# Patient Record
Sex: FEMALE | Race: WHITE | Employment: PART TIME | URBAN - METROPOLITAN AREA
[De-identification: names, ages, dates, MRNs, and addresses within clinical notes are randomized per-mention and may not be internally consistent; named-entity substitution may affect disease eponyms.]

---

## 2020-07-27 ENCOUNTER — TRANSFERRED RECORDS (OUTPATIENT)
Dept: HEALTH INFORMATION MANAGEMENT | Facility: CLINIC | Age: 65
End: 2020-07-27

## 2020-08-25 ENCOUNTER — TELEPHONE (OUTPATIENT)
Dept: NEUROSURGERY | Facility: CLINIC | Age: 65
End: 2020-08-25

## 2020-08-25 ENCOUNTER — OFFICE VISIT (OUTPATIENT)
Dept: NEUROSURGERY | Facility: CLINIC | Age: 65
End: 2020-08-25
Attending: NEUROLOGICAL SURGERY
Payer: OTHER GOVERNMENT

## 2020-08-25 VITALS
WEIGHT: 230 LBS | HEART RATE: 63 BPM | OXYGEN SATURATION: 100 % | DIASTOLIC BLOOD PRESSURE: 80 MMHG | SYSTOLIC BLOOD PRESSURE: 152 MMHG | BODY MASS INDEX: 36.96 KG/M2 | HEIGHT: 66 IN

## 2020-08-25 DIAGNOSIS — M47.12 CERVICAL SPONDYLOSIS WITH MYELOPATHY: Primary | ICD-10-CM

## 2020-08-25 PROCEDURE — 99204 OFFICE O/P NEW MOD 45 MIN: CPT | Performed by: NEUROLOGICAL SURGERY

## 2020-08-25 PROCEDURE — G0463 HOSPITAL OUTPT CLINIC VISIT: HCPCS

## 2020-08-25 RX ORDER — CYCLOBENZAPRINE HCL 5 MG
2.5 TABLET ORAL AT BEDTIME
COMMUNITY
Start: 2020-07-20

## 2020-08-25 RX ORDER — LOSARTAN POTASSIUM 25 MG/1
25 TABLET ORAL EVERY MORNING
COMMUNITY
Start: 2020-07-20 | End: 2021-07-20

## 2020-08-25 RX ORDER — AMITRIPTYLINE HYDROCHLORIDE 10 MG/1
10 TABLET ORAL AT BEDTIME
COMMUNITY
Start: 2020-06-23 | End: 2021-06-23

## 2020-08-25 RX ORDER — FUROSEMIDE 20 MG
20 TABLET ORAL
COMMUNITY
Start: 2020-06-23 | End: 2021-06-23

## 2020-08-25 RX ORDER — DULOXETIN HYDROCHLORIDE 30 MG/1
30 CAPSULE, DELAYED RELEASE ORAL EVERY MORNING
COMMUNITY
Start: 2020-06-23 | End: 2020-11-03

## 2020-08-25 RX ORDER — NAPROXEN SODIUM 220 MG
440 TABLET ORAL EVERY MORNING
Status: ON HOLD | COMMUNITY
End: 2020-11-05

## 2020-08-25 RX ORDER — TRIAMCINOLONE ACETONIDE 1 MG/G
0.1 CREAM TOPICAL 2 TIMES DAILY PRN
COMMUNITY
Start: 2020-07-20

## 2020-08-25 RX ORDER — ATENOLOL 25 MG/1
25 TABLET ORAL
COMMUNITY
Start: 2020-07-20

## 2020-08-25 RX ORDER — TRAMADOL HYDROCHLORIDE 50 MG/1
50 TABLET ORAL 4 TIMES DAILY PRN
Status: ON HOLD | COMMUNITY
Start: 2020-08-12 | End: 2020-11-05

## 2020-08-25 RX ORDER — SODIUM PHOSPHATE,MONO-DIBASIC 19G-7G/118
500 ENEMA (ML) RECTAL DAILY
COMMUNITY

## 2020-08-25 ASSESSMENT — PAIN SCALES - GENERAL: PAINLEVEL: EXTREME PAIN (8)

## 2020-08-25 ASSESSMENT — MIFFLIN-ST. JEOR: SCORE: 1620.02

## 2020-08-25 NOTE — PATIENT INSTRUCTIONS
Patient Instructions    Surgery scheduled at [facility] for anterior cervical discectomy and spinal cord decompression at C5,6,7 with bone graft and titanium plate with Dr. Parkinson    Pre-Operative    Surgical risks: blood clots in the leg or lung, problems urinating, nerve damage, drainage from the incision, infection,stiffness    Pre-operative physical with primary care physician within 30 days of surgical date.     2-4 night hospitalization stay    Shower procedure  o Please shower with antimicrobial soap the night before surgery and morning of surgery. Please refer to showering instruction sheet in folder.    Eating/Drinking  o Stop all solid foods 8 hours before surgery.  o Keep drinking clear liquids until 4 hours before surgery  - Clear liquids include water, clear juice, black coffee, or clear tea without milk, Gatorade, clear soda.     Medications  o Hold Aspirin, NSAIDs (Advil/Ibuprofen, Indocin, Naproxen,Nuprin,Relafen/Nabumetone, Diclofenac,Meloxicam, Aleve, Celebrex) x 7 days prior to surgical date  o Hold methotrexate, Xeljanz for 1-2 weeks prior to surgery and continue to hold 2 weeks post surgery.   o You can take Tylenol (Acetaminophen) for pain, 1000 mg  - Do not exceed 3,000 mg per day   o Any other medications prescribed, please discuss with your primary care provider at your pre-operative physical     Pain Management    You will have some post-operative incisional pain which may require pain medications and muscle relaxants. You will receive medication upon discharge.    You may resume taking NSAIDs (ex. Ibuprofen, aleve, naproxen) 12 weeks after surgery     Do NOT drive while taking narcotic pain medication    Do NOT drink alcohol while using any pain medication    You can utilize ice as needed (no longer than 20 minutes at one time)    Incision Care    No submerging incision in water such as pools, hot tubs, baths for at least 8 weeks or until incision is healed    You may  get your incision wet in the shower. Allow water to run over incision, and gently pat dry.     Remove dressing as instructed upon discharge    Watch for signs of infection  o Redness, swelling, warmth, drainage, and fever of 101 degrees or higher  o Notify clinic [phone].    Activity Restrictions    For the first 6-8 weeks, no lifting > 10 pounds, no bending, twisting, or overhead reaching.    Take stairs in moderation     Ok to walk as tolerated, take short frequent walks. You may gradually increase the distance as tolerated.     Avoid bed rest and prolonged sitting for longer than 30 minutes (change positions frequently while awake)    No contact sports until after follow up visit    No high impact activities such as; running/jogging, snowmobile or 4 larkin riding or any other recreational vehicles    Please call the clinic if you develop any of the following symptoms:  o Swelling and/or warmth in one or both legs  o Pain or tenderness in your leg, ankle, foot, or arm   o Red or discolored skin     Post-Op Follow Up Appointments    2 week staple/suture removal with RN    6 week post op with xray prior     3 months post op with xray prior     6 months post op with xray prior    1 year post op with xray prior    Please call to schedule follow up and xray appointments at [phone]    Resources    If you are currently employed, you will need to be off work for 2-4 weeks for post op recovery and healing.    Please fax any FMLA/short term disability paperwork to [phone]    You may call our clinic when you'd like to return to work and we can provide a work letter    To allow staff adequate time to complete paperwork, please send as soon as possible

## 2020-08-25 NOTE — TELEPHONE ENCOUNTER
Off work letter created. Signed by Dr. Parkinson. Patient will call clinic back with her employer's fax number. Letter placed in nursing bin.
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
## 2020-08-25 NOTE — NURSING NOTE
"Vanessa Hernandez is a 64 year old female who presents for:  Chief Complaint   Patient presents with     Neck Pain     Cervical radiculopathy        Initial Vitals:  BP (!) 152/80 (BP Location: Left arm, Patient Position: Sitting)   Pulse 63   Ht 5' 6\" (1.676 m)   Wt 230 lb (104.3 kg)   SpO2 100%   BMI 37.12 kg/m   Estimated body mass index is 37.12 kg/m  as calculated from the following:    Height as of this encounter: 5' 6\" (1.676 m).    Weight as of this encounter: 230 lb (104.3 kg).. Body surface area is 2.2 meters squared. BP completed using cuff size: large  Extreme Pain (8)    Nursing Comments: Patient presents w/ bilateral neck pain down to fingers tingling sensation    Tom Banerjee MA  "

## 2020-08-25 NOTE — LETTER
[facility]   Neurosurgery Clinic   [address] [place] [address]  [id number]        To Whom it May Concern,      Vanessa Hernandez is being seen at our clinic. Please excuse her from work from 8/25/2020 through 9/15/2020.      Please call our clinic with questions or concerns: [phone]      Sincerely,            Rio Parkinson MD

## 2020-08-25 NOTE — NURSING NOTE
Reviewed pre- and post-operative instructions as outlined in the After Visit Summary/Patient Instructions with patient.   Surgery folder was given to patient    Patient Education Topic: Procedure with Dr. Parkinson     Learner(s): Patient    Knowledge Level: Basic    Readiness to Learn: Ready    Method:  Verbal explanation and Written material     Outcome: Able to verbalize instructions    Barriers to Learning: No barrier    Patient had the opportunity for questions to be answered. Advised Patient to call clinic with any questions/concerns. Gave patient antibacterial soap for pre-surgery skin preparation. Escorted patient to Pia, surgery scheduler, to schedule surgery.     Patient was offered for surgery on September 2nd as Dr. Parkinson is requesting this urgently due to spinal cord compression. Patient is aware that she needs to have surgery sooner rather than later. Patient declined to schedule at this time as she has an upcoming concert planned. She was advised to call Pia when ready to schedule.     Dr. Parkinson is OK with an off work letter from today through September 15th.

## 2020-08-26 DIAGNOSIS — G95.20 SPINAL CORD COMPRESSION (H): Primary | ICD-10-CM

## 2020-08-26 DIAGNOSIS — Z11.59 ENCOUNTER FOR SCREENING FOR OTHER VIRAL DISEASES: Primary | ICD-10-CM

## 2020-08-30 DIAGNOSIS — Z11.59 ENCOUNTER FOR SCREENING FOR OTHER VIRAL DISEASES: ICD-10-CM

## 2020-08-30 PROCEDURE — U0003 INFECTIOUS AGENT DETECTION BY NUCLEIC ACID (DNA OR RNA); SEVERE ACUTE RESPIRATORY SYNDROME CORONAVIRUS 2 (SARS-COV-2) (CORONAVIRUS DISEASE [COVID-19]), AMPLIFIED PROBE TECHNIQUE, MAKING USE OF HIGH THROUGHPUT TECHNOLOGIES AS DESCRIBED BY CMS-2020-01-R: HCPCS | Performed by: NEUROLOGICAL SURGERY

## 2020-08-31 LAB
SARS-COV-2 RNA SPEC QL NAA+PROBE: NOT DETECTED
SPECIMEN SOURCE: NORMAL

## 2020-09-02 ENCOUNTER — HOSPITAL ENCOUNTER (OUTPATIENT)
Facility: CLINIC | Age: 65
End: 2020-09-02
Attending: NEUROLOGICAL SURGERY | Admitting: NEUROLOGICAL SURGERY

## 2020-09-03 DIAGNOSIS — Z11.59 ENCOUNTER FOR SCREENING FOR OTHER VIRAL DISEASES: Primary | ICD-10-CM

## 2020-09-16 ENCOUNTER — TELEPHONE (OUTPATIENT)
Dept: NEUROSURGERY | Facility: CLINIC | Age: 65
End: 2020-09-16

## 2020-09-16 NOTE — TELEPHONE ENCOUNTER
Patient needed updated work letter. Quan Schmidt PA-C signed. Informed patient letter was faxed.     Faxed work letter September 16, 2020 to fax number [phone]    Right Fax confirmed at 1610 PM    Ivelisse Yanez RN

## 2020-09-16 NOTE — LETTER
[facility]  Neurosurgery Clinic  [address] [place] [address]  [phone]        9/16/2020    To Whom it May Concern,      Vanessa Hernandez is being seen at our clinic and is scheduled for an upcoming surgical procedure on 9/23/20. Please excuse her from work from 9/15/2020-11/4/20.    She will be re-evaluated at the next follow up visit on 11/4/20. Please call our clinic with questions or concerns: [phone]      Sincerely,        Quan Schmidt PA-C

## 2020-10-22 ENCOUNTER — TELEPHONE (OUTPATIENT)
Dept: NEUROSURGERY | Facility: CLINIC | Age: 65
End: 2020-10-22

## 2020-10-22 NOTE — LETTER
[facility]    Neurosurgery Clinic  [address] [place] [address]  [id number]      10/22/2020     To Whom it May Concern,        Vanessa Hernandez is being seen at our clinic and is scheduled for an upcoming surgical procedure on 11/4. Please excuse her from work from 10/22/20-12/16/2020.     She will be re-evaluated at the next follow up visit on 11/4/20. Please call our clinic with questions or concerns: [phone]        Sincerely,           Quan Schmidt PA-C

## 2020-10-22 NOTE — TELEPHONE ENCOUNTER
Spoke to patient to see where she would like letter sent. Reviewed letter with her and she needed the letter changed to reflect today date until 6 weeks post op so there is no lapse in coverage. Letter updated and signed by Quan Schmidt PA-C.     Faxed work letter ATTN: [employer]  October 22, 2020 to fax number [phone]    Right Fax confirmed at 1535 PM    Ivelisse Yanez RN

## 2020-10-22 NOTE — TELEPHONE ENCOUNTER
Called and spoke to the patient. She needs an updated letter since her surgery date was moved. Updated letter to include being off work from 11/4-12/16 (6 weeks). Will route to Quan Schmidt PA-C to have him sign next time he is in the office.

## 2020-10-22 NOTE — LETTER
[facility]    Neurosurgery Clinic  [address] [place] [address]  [phone]      9/16/2020     To Whom it May Concern,        Vanessa Hernandez is being seen at our clinic and is scheduled for an upcoming surgical procedure on 11/4. Please excuse her from work from 11/4-12/16/2020     She will be re-evaluated at the next follow up visit on 11/4/20. Please call our clinic with questions or concerns: [phone]        Sincerely,           Quan Schmidt PA-C

## 2020-11-01 DIAGNOSIS — Z11.59 ENCOUNTER FOR SCREENING FOR OTHER VIRAL DISEASES: ICD-10-CM

## 2020-11-01 PROCEDURE — U0003 INFECTIOUS AGENT DETECTION BY NUCLEIC ACID (DNA OR RNA); SEVERE ACUTE RESPIRATORY SYNDROME CORONAVIRUS 2 (SARS-COV-2) (CORONAVIRUS DISEASE [COVID-19]), AMPLIFIED PROBE TECHNIQUE, MAKING USE OF HIGH THROUGHPUT TECHNOLOGIES AS DESCRIBED BY CMS-2020-01-R: HCPCS | Performed by: NEUROLOGICAL SURGERY

## 2020-11-02 LAB
SARS-COV-2 RNA SPEC QL NAA+PROBE: NOT DETECTED
SPECIMEN SOURCE: NORMAL

## 2020-11-03 RX ORDER — ALBUTEROL SULFATE 90 UG/1
2 AEROSOL, METERED RESPIRATORY (INHALATION) EVERY 4 HOURS PRN
COMMUNITY

## 2020-11-03 RX ORDER — FAMOTIDINE 10 MG
10 TABLET ORAL DAILY PRN
COMMUNITY

## 2020-11-03 RX ORDER — LORAZEPAM 0.5 MG/1
0.5 TABLET ORAL
COMMUNITY

## 2020-11-03 RX ORDER — ASCORBIC ACID 500 MG
500 TABLET ORAL DAILY
COMMUNITY

## 2020-11-03 NOTE — PROGRESS NOTES
PTA medications updated by Medication Scribe prior to surgery via phone call with patient      -LAST DOSES ENTERED BY NURSE-    Comments:    Medication history sources: Patient, Surescripts and H&P  Medication history source reliability: Moderate  Adherence assessment: N/A Not Observed    Significant changes made to the medication list:  None      Additional medication history information:   None        Prior to Admission medications    Medication Sig Last Dose Taking? Auth Provider   albuterol (PROAIR HFA/PROVENTIL HFA/VENTOLIN HFA) 108 (90 Base) MCG/ACT inhaler Inhale 2 puffs into the lungs every 4 hours as needed for shortness of breath / dyspnea or wheezing  at PRN Yes Reported, Patient   amitriptyline (ELAVIL) 10 MG tablet Take 10 mg by mouth At Bedtime   at PM Yes Reported, Patient   atenolol (TENORMIN) 25 MG tablet Take 25 mg by mouth 2 times daily   at AM Yes Reported, Patient   cyclobenzaprine (FLEXERIL) 5 MG tablet Take 2.5 mg by mouth nightly as needed (0.5 X 5 mg = 2.5 mg dose)  at PM Yes Reported, Patient   famotidine (PEPCID) 10 MG tablet Take 10 mg by mouth daily as needed (upset stomach)  at PRN Yes Reported, Patient   furosemide (LASIX) 20 MG tablet Take 20 mg by mouth daily before breakfast  at AM Yes Reported, Patient   glucosamine-chondroitin 500-400 MG CAPS per capsule Take 500 capsules by mouth daily  at AM Yes Reported, Patient   HEMP OIL OR EXTRACT OR OTHER CBD CANNABINOID, NOT MEDICAL CANNABIS, Apply topically nightly as needed  at PM Yes Reported, Patient   LORazepam (ATIVAN) 0.5 MG tablet Take 0.5 mg by mouth nightly as needed for anxiety  at PM Yes Reported, Patient   losartan (COZAAR) 25 MG tablet Take 25 mg by mouth every morning   at AM Yes Reported, Patient   naproxen sodium (ANAPROX) 220 MG tablet Take 440 mg by mouth every morning (220mg x 2 = 440mg)  at AM Yes Reported, Patient   traMADol (ULTRAM) 50 MG tablet Take 50 mg by mouth 4 times daily as needed   at PM Yes Reported,  Patient   triamcinolone (KENALOG) 0.1 % external cream Apply 0.1 mg% topically 2 times daily as needed (rash)   at PRN Yes Reported, Patient   vitamin C (ASCORBIC ACID) 500 MG tablet Take 500 mg by mouth daily  at AM Yes Reported, Patient

## 2020-11-04 ENCOUNTER — APPOINTMENT (OUTPATIENT)
Dept: GENERAL RADIOLOGY | Facility: CLINIC | Age: 65
End: 2020-11-04
Attending: NEUROLOGICAL SURGERY
Payer: OTHER GOVERNMENT

## 2020-11-04 ENCOUNTER — HOSPITAL ENCOUNTER (OUTPATIENT)
Facility: CLINIC | Age: 65
Discharge: HOME | End: 2020-11-05
Attending: NEUROLOGICAL SURGERY | Admitting: NEUROLOGICAL SURGERY
Payer: OTHER GOVERNMENT

## 2020-11-04 ENCOUNTER — ANESTHESIA EVENT (OUTPATIENT)
Dept: SURGERY | Facility: CLINIC | Age: 65
End: 2020-11-04
Payer: OTHER GOVERNMENT

## 2020-11-04 ENCOUNTER — ANESTHESIA (OUTPATIENT)
Dept: SURGERY | Facility: CLINIC | Age: 65
End: 2020-11-04
Payer: OTHER GOVERNMENT

## 2020-11-04 DIAGNOSIS — M43.22 CERVICAL VERTEBRAL FUSION: Primary | ICD-10-CM

## 2020-11-04 LAB — POTASSIUM SERPL-SCNC: 4.2 MMOL/L (ref 3.4–5.3)

## 2020-11-04 PROCEDURE — 922N000001 HC NEURO MONITORING SERVICE, UP TO 7 HOURS (T1FEE): Performed by: NEUROLOGICAL SURGERY

## 2020-11-04 PROCEDURE — 370N000002 HC ANESTHESIA TECHNICAL FEE, EACH ADDTL 15 MIN: Performed by: NEUROLOGICAL SURGERY

## 2020-11-04 PROCEDURE — 999N000138 HC STATISTIC PRE-PROCEDURE ASSESSMENT I: Performed by: NEUROLOGICAL SURGERY

## 2020-11-04 PROCEDURE — 84132 ASSAY OF SERUM POTASSIUM: CPT | Performed by: ANESTHESIOLOGY

## 2020-11-04 PROCEDURE — 258N000003 HC RX IP 258 OP 636: Performed by: PHYSICIAN ASSISTANT

## 2020-11-04 PROCEDURE — 250N000011 HC RX IP 250 OP 636: Performed by: NEUROLOGICAL SURGERY

## 2020-11-04 PROCEDURE — 250N000011 HC RX IP 250 OP 636: Performed by: NURSE ANESTHETIST, CERTIFIED REGISTERED

## 2020-11-04 PROCEDURE — 36415 COLL VENOUS BLD VENIPUNCTURE: CPT | Performed by: ANESTHESIOLOGY

## 2020-11-04 PROCEDURE — 370N000001 HC ANESTHESIA TECHNICAL FEE, 1ST 30 MIN: Performed by: NEUROLOGICAL SURGERY

## 2020-11-04 PROCEDURE — 761N000002 HC RECOVERY PHASE 1 LEVEL 1 EA ADDTL HR: Performed by: NEUROLOGICAL SURGERY

## 2020-11-04 PROCEDURE — C1762 CONN TISS, HUMAN(INC FASCIA): HCPCS | Performed by: NEUROLOGICAL SURGERY

## 2020-11-04 PROCEDURE — 22853 INSJ BIOMECHANICAL DEVICE: CPT | Performed by: NEUROLOGICAL SURGERY

## 2020-11-04 PROCEDURE — 22845 INSERT SPINE FIXATION DEVICE: CPT | Mod: AS | Performed by: PHYSICIAN ASSISTANT

## 2020-11-04 PROCEDURE — 761N000001 HC RECOVERY PHASE 1 LEVEL 1 FIRST HR: Performed by: NEUROLOGICAL SURGERY

## 2020-11-04 PROCEDURE — 272N000282 HC OR IOM SUPPLIES OPNP: Performed by: NEUROLOGICAL SURGERY

## 2020-11-04 PROCEDURE — 250N000009 HC RX 250: Performed by: NEUROLOGICAL SURGERY

## 2020-11-04 PROCEDURE — 258N000003 HC RX IP 258 OP 636: Performed by: NURSE ANESTHETIST, CERTIFIED REGISTERED

## 2020-11-04 PROCEDURE — 120N000001 HC R&B MED SURG/OB

## 2020-11-04 PROCEDURE — 250N000013 HC RX MED GY IP 250 OP 250 PS 637: Performed by: PHYSICIAN ASSISTANT

## 2020-11-04 PROCEDURE — 22853 INSJ BIOMECHANICAL DEVICE: CPT | Mod: AS | Performed by: PHYSICIAN ASSISTANT

## 2020-11-04 PROCEDURE — 250N000003 HC SEVOFLURANE, EA 15 MIN: Performed by: NEUROLOGICAL SURGERY

## 2020-11-04 PROCEDURE — 22845 INSERT SPINE FIXATION DEVICE: CPT | Mod: 59 | Performed by: NEUROLOGICAL SURGERY

## 2020-11-04 PROCEDURE — 360N000033 HC SURGERY LEVEL 5 EA 15 ADDTL MIN: Performed by: NEUROLOGICAL SURGERY

## 2020-11-04 PROCEDURE — 250N000009 HC RX 250: Performed by: PHYSICIAN ASSISTANT

## 2020-11-04 PROCEDURE — 22551 ARTHRD ANT NTRBDY CERVICAL: CPT | Performed by: NEUROLOGICAL SURGERY

## 2020-11-04 PROCEDURE — 360N000036 HC SURGERY LEVEL 5 W FLUORO 1ST 30 MIN: Performed by: NEUROLOGICAL SURGERY

## 2020-11-04 PROCEDURE — 272N000001 HC OR GENERAL SUPPLY STERILE: Performed by: NEUROLOGICAL SURGERY

## 2020-11-04 PROCEDURE — 999N000179 XR SURGERY CARM FLUORO LESS THAN 5 MIN W STILLS

## 2020-11-04 PROCEDURE — 22551 ARTHRD ANT NTRBDY CERVICAL: CPT | Mod: AS | Performed by: PHYSICIAN ASSISTANT

## 2020-11-04 PROCEDURE — C1713 ANCHOR/SCREW BN/BN,TIS/BN: HCPCS | Performed by: NEUROLOGICAL SURGERY

## 2020-11-04 PROCEDURE — 250N000009 HC RX 250: Performed by: NURSE ANESTHETIST, CERTIFIED REGISTERED

## 2020-11-04 PROCEDURE — 250N000011 HC RX IP 250 OP 636: Performed by: ANESTHESIOLOGY

## 2020-11-04 PROCEDURE — 20930 SP BONE ALGRFT MORSEL ADD-ON: CPT | Performed by: NEUROLOGICAL SURGERY

## 2020-11-04 PROCEDURE — 250N000011 HC RX IP 250 OP 636: Performed by: PHYSICIAN ASSISTANT

## 2020-11-04 DEVICE — GRAFT CANC CORT BLOCK LORDOTIC ASR 7X14X14MM 345744: Type: IMPLANTABLE DEVICE | Site: SPINE CERVICAL | Status: FUNCTIONAL

## 2020-11-04 DEVICE — IMP SCR CERVICAL MEDT ATLANTIS 4.0X14MM ST VA 3120314: Type: IMPLANTABLE DEVICE | Site: SPINE CERVICAL | Status: FUNCTIONAL

## 2020-11-04 DEVICE — IMPLANTABLE DEVICE: Type: IMPLANTABLE DEVICE | Site: SPINE CERVICAL | Status: FUNCTIONAL

## 2020-11-04 RX ORDER — FENTANYL CITRATE 50 UG/ML
25-100 INJECTION, SOLUTION INTRAMUSCULAR; INTRAVENOUS
Status: DISCONTINUED | OUTPATIENT
Start: 2020-11-04 | End: 2020-11-04 | Stop reason: HOSPADM

## 2020-11-04 RX ORDER — PROCHLORPERAZINE MALEATE 10 MG
10 TABLET ORAL EVERY 6 HOURS PRN
Status: DISCONTINUED | OUTPATIENT
Start: 2020-11-04 | End: 2020-11-05 | Stop reason: HOSPADM

## 2020-11-04 RX ORDER — SODIUM CHLORIDE, SODIUM LACTATE, POTASSIUM CHLORIDE, CALCIUM CHLORIDE 600; 310; 30; 20 MG/100ML; MG/100ML; MG/100ML; MG/100ML
INJECTION, SOLUTION INTRAVENOUS CONTINUOUS PRN
Status: DISCONTINUED | OUTPATIENT
Start: 2020-11-04 | End: 2020-11-04

## 2020-11-04 RX ORDER — TRIAMCINOLONE ACETONIDE 1 MG/G
CREAM TOPICAL 2 TIMES DAILY PRN
Status: DISCONTINUED | OUTPATIENT
Start: 2020-11-04 | End: 2020-11-05 | Stop reason: HOSPADM

## 2020-11-04 RX ORDER — NAPROXEN SODIUM 220 MG
440 TABLET ORAL EVERY MORNING
Status: DISCONTINUED | OUTPATIENT
Start: 2020-11-05 | End: 2020-11-05 | Stop reason: DRUGHIGH

## 2020-11-04 RX ORDER — SODIUM CHLORIDE, SODIUM LACTATE, POTASSIUM CHLORIDE, CALCIUM CHLORIDE 600; 310; 30; 20 MG/100ML; MG/100ML; MG/100ML; MG/100ML
INJECTION, SOLUTION INTRAVENOUS CONTINUOUS
Status: DISCONTINUED | OUTPATIENT
Start: 2020-11-04 | End: 2020-11-04 | Stop reason: HOSPADM

## 2020-11-04 RX ORDER — HYDROMORPHONE HYDROCHLORIDE 1 MG/ML
.3-.5 INJECTION, SOLUTION INTRAMUSCULAR; INTRAVENOUS; SUBCUTANEOUS
Status: DISCONTINUED | OUTPATIENT
Start: 2020-11-04 | End: 2020-11-05 | Stop reason: HOSPADM

## 2020-11-04 RX ORDER — HYDROMORPHONE HYDROCHLORIDE 1 MG/ML
.3-.5 INJECTION, SOLUTION INTRAMUSCULAR; INTRAVENOUS; SUBCUTANEOUS EVERY 10 MIN PRN
Status: DISCONTINUED | OUTPATIENT
Start: 2020-11-04 | End: 2020-11-04 | Stop reason: HOSPADM

## 2020-11-04 RX ORDER — ONDANSETRON 2 MG/ML
4 INJECTION INTRAMUSCULAR; INTRAVENOUS EVERY 6 HOURS PRN
Status: DISCONTINUED | OUTPATIENT
Start: 2020-11-04 | End: 2020-11-05 | Stop reason: HOSPADM

## 2020-11-04 RX ORDER — NALOXONE HYDROCHLORIDE 0.4 MG/ML
.1-.4 INJECTION, SOLUTION INTRAMUSCULAR; INTRAVENOUS; SUBCUTANEOUS
Status: DISCONTINUED | OUTPATIENT
Start: 2020-11-04 | End: 2020-11-04 | Stop reason: HOSPADM

## 2020-11-04 RX ORDER — FUROSEMIDE 20 MG
20 TABLET ORAL
Status: DISCONTINUED | OUTPATIENT
Start: 2020-11-05 | End: 2020-11-05 | Stop reason: HOSPADM

## 2020-11-04 RX ORDER — ALBUTEROL SULFATE 90 UG/1
2 AEROSOL, METERED RESPIRATORY (INHALATION) EVERY 4 HOURS PRN
Status: DISCONTINUED | OUTPATIENT
Start: 2020-11-04 | End: 2020-11-05 | Stop reason: HOSPADM

## 2020-11-04 RX ORDER — DEXAMETHASONE SODIUM PHOSPHATE 4 MG/ML
INJECTION, SOLUTION INTRA-ARTICULAR; INTRALESIONAL; INTRAMUSCULAR; INTRAVENOUS; SOFT TISSUE PRN
Status: DISCONTINUED | OUTPATIENT
Start: 2020-11-04 | End: 2020-11-04

## 2020-11-04 RX ORDER — LIDOCAINE 40 MG/G
CREAM TOPICAL
Status: DISCONTINUED | OUTPATIENT
Start: 2020-11-04 | End: 2020-11-05 | Stop reason: HOSPADM

## 2020-11-04 RX ORDER — DIAZEPAM 10 MG/2ML
2.5 INJECTION, SOLUTION INTRAMUSCULAR; INTRAVENOUS
Status: DISCONTINUED | OUTPATIENT
Start: 2020-11-04 | End: 2020-11-04 | Stop reason: HOSPADM

## 2020-11-04 RX ORDER — ONDANSETRON 2 MG/ML
INJECTION INTRAMUSCULAR; INTRAVENOUS PRN
Status: DISCONTINUED | OUTPATIENT
Start: 2020-11-04 | End: 2020-11-04

## 2020-11-04 RX ORDER — CLINDAMYCIN PHOSPHATE 900 MG/50ML
900 INJECTION, SOLUTION INTRAVENOUS SEE ADMIN INSTRUCTIONS
Status: DISCONTINUED | OUTPATIENT
Start: 2020-11-04 | End: 2020-11-04 | Stop reason: HOSPADM

## 2020-11-04 RX ORDER — ACETAMINOPHEN 325 MG/1
975 TABLET ORAL EVERY 8 HOURS
Status: DISCONTINUED | OUTPATIENT
Start: 2020-11-04 | End: 2020-11-05 | Stop reason: HOSPADM

## 2020-11-04 RX ORDER — ATENOLOL 25 MG/1
25 TABLET ORAL
Status: DISCONTINUED | OUTPATIENT
Start: 2020-11-04 | End: 2020-11-05 | Stop reason: HOSPADM

## 2020-11-04 RX ORDER — ONDANSETRON 4 MG/1
4 TABLET, ORALLY DISINTEGRATING ORAL EVERY 6 HOURS PRN
Status: DISCONTINUED | OUTPATIENT
Start: 2020-11-04 | End: 2020-11-05 | Stop reason: HOSPADM

## 2020-11-04 RX ORDER — OXYCODONE HYDROCHLORIDE 5 MG/1
5-10 TABLET ORAL
Status: DISCONTINUED | OUTPATIENT
Start: 2020-11-04 | End: 2020-11-05 | Stop reason: HOSPADM

## 2020-11-04 RX ORDER — TRAMADOL HYDROCHLORIDE 50 MG/1
50 TABLET ORAL 4 TIMES DAILY PRN
Status: DISCONTINUED | OUTPATIENT
Start: 2020-11-04 | End: 2020-11-05 | Stop reason: HOSPADM

## 2020-11-04 RX ORDER — PROPOFOL 10 MG/ML
INJECTION, EMULSION INTRAVENOUS PRN
Status: DISCONTINUED | OUTPATIENT
Start: 2020-11-04 | End: 2020-11-04

## 2020-11-04 RX ORDER — LORAZEPAM 0.5 MG/1
0.5 TABLET ORAL
Status: DISCONTINUED | OUTPATIENT
Start: 2020-11-04 | End: 2020-11-05 | Stop reason: HOSPADM

## 2020-11-04 RX ORDER — PROPOFOL 10 MG/ML
INJECTION, EMULSION INTRAVENOUS CONTINUOUS PRN
Status: DISCONTINUED | OUTPATIENT
Start: 2020-11-04 | End: 2020-11-04

## 2020-11-04 RX ORDER — ONDANSETRON 4 MG/1
4 TABLET, ORALLY DISINTEGRATING ORAL EVERY 30 MIN PRN
Status: DISCONTINUED | OUTPATIENT
Start: 2020-11-04 | End: 2020-11-04 | Stop reason: HOSPADM

## 2020-11-04 RX ORDER — CLINDAMYCIN PHOSPHATE 900 MG/50ML
900 INJECTION, SOLUTION INTRAVENOUS
Status: COMPLETED | OUTPATIENT
Start: 2020-11-04 | End: 2020-11-04

## 2020-11-04 RX ORDER — SODIUM CHLORIDE 9 MG/ML
INJECTION, SOLUTION INTRAVENOUS CONTINUOUS
Status: DISCONTINUED | OUTPATIENT
Start: 2020-11-04 | End: 2020-11-05 | Stop reason: HOSPADM

## 2020-11-04 RX ORDER — MEPERIDINE HYDROCHLORIDE 25 MG/ML
12.5 INJECTION INTRAMUSCULAR; INTRAVENOUS; SUBCUTANEOUS
Status: DISCONTINUED | OUTPATIENT
Start: 2020-11-04 | End: 2020-11-04 | Stop reason: HOSPADM

## 2020-11-04 RX ORDER — NALOXONE HYDROCHLORIDE 0.4 MG/ML
.1-.4 INJECTION, SOLUTION INTRAMUSCULAR; INTRAVENOUS; SUBCUTANEOUS
Status: DISCONTINUED | OUTPATIENT
Start: 2020-11-04 | End: 2020-11-05 | Stop reason: HOSPADM

## 2020-11-04 RX ORDER — EPHEDRINE SULFATE 50 MG/ML
INJECTION, SOLUTION INTRAMUSCULAR; INTRAVENOUS; SUBCUTANEOUS PRN
Status: DISCONTINUED | OUTPATIENT
Start: 2020-11-04 | End: 2020-11-04

## 2020-11-04 RX ORDER — FENTANYL CITRATE 50 UG/ML
INJECTION, SOLUTION INTRAMUSCULAR; INTRAVENOUS PRN
Status: DISCONTINUED | OUTPATIENT
Start: 2020-11-04 | End: 2020-11-04

## 2020-11-04 RX ORDER — ACETAMINOPHEN 325 MG/1
650 TABLET ORAL EVERY 4 HOURS PRN
Status: DISCONTINUED | OUTPATIENT
Start: 2020-11-07 | End: 2020-11-05 | Stop reason: HOSPADM

## 2020-11-04 RX ORDER — LOSARTAN POTASSIUM 25 MG/1
25 TABLET ORAL EVERY MORNING
Status: DISCONTINUED | OUTPATIENT
Start: 2020-11-05 | End: 2020-11-05 | Stop reason: HOSPADM

## 2020-11-04 RX ORDER — FENTANYL CITRATE 50 UG/ML
25-50 INJECTION, SOLUTION INTRAMUSCULAR; INTRAVENOUS
Status: DISCONTINUED | OUTPATIENT
Start: 2020-11-04 | End: 2020-11-04 | Stop reason: HOSPADM

## 2020-11-04 RX ORDER — LIDOCAINE HYDROCHLORIDE 20 MG/ML
INJECTION, SOLUTION INFILTRATION; PERINEURAL PRN
Status: DISCONTINUED | OUTPATIENT
Start: 2020-11-04 | End: 2020-11-04

## 2020-11-04 RX ORDER — AMITRIPTYLINE HYDROCHLORIDE 10 MG/1
10 TABLET ORAL AT BEDTIME
Status: DISCONTINUED | OUTPATIENT
Start: 2020-11-04 | End: 2020-11-05 | Stop reason: HOSPADM

## 2020-11-04 RX ORDER — HYDROXYZINE HYDROCHLORIDE 25 MG/1
25 TABLET, FILM COATED ORAL EVERY 6 HOURS PRN
Status: DISCONTINUED | OUTPATIENT
Start: 2020-11-04 | End: 2020-11-05 | Stop reason: HOSPADM

## 2020-11-04 RX ORDER — DOCUSATE SODIUM 100 MG/1
100 CAPSULE, LIQUID FILLED ORAL 2 TIMES DAILY
Status: DISCONTINUED | OUTPATIENT
Start: 2020-11-04 | End: 2020-11-05 | Stop reason: HOSPADM

## 2020-11-04 RX ORDER — ACETAMINOPHEN 650 MG/1
650 SUPPOSITORY RECTAL EVERY 4 HOURS PRN
Status: DISCONTINUED | OUTPATIENT
Start: 2020-11-04 | End: 2020-11-04 | Stop reason: HOSPADM

## 2020-11-04 RX ORDER — ASCORBIC ACID 500 MG
500 TABLET ORAL DAILY
Status: DISCONTINUED | OUTPATIENT
Start: 2020-11-04 | End: 2020-11-05 | Stop reason: HOSPADM

## 2020-11-04 RX ORDER — DEXAMETHASONE SODIUM PHOSPHATE 10 MG/ML
4 INJECTION, SOLUTION INTRAMUSCULAR; INTRAVENOUS ONCE
Status: DISCONTINUED | OUTPATIENT
Start: 2020-11-04 | End: 2020-11-04 | Stop reason: HOSPADM

## 2020-11-04 RX ORDER — ONDANSETRON 2 MG/ML
4 INJECTION INTRAMUSCULAR; INTRAVENOUS EVERY 30 MIN PRN
Status: DISCONTINUED | OUTPATIENT
Start: 2020-11-04 | End: 2020-11-04 | Stop reason: HOSPADM

## 2020-11-04 RX ORDER — CYCLOBENZAPRINE HYDROCHLORIDE 5 MG/1
2.5 TABLET, FILM COATED ORAL
Status: DISCONTINUED | OUTPATIENT
Start: 2020-11-04 | End: 2020-11-05 | Stop reason: HOSPADM

## 2020-11-04 RX ORDER — FAMOTIDINE 10 MG
10 TABLET ORAL DAILY PRN
Status: DISCONTINUED | OUTPATIENT
Start: 2020-11-04 | End: 2020-11-05 | Stop reason: HOSPADM

## 2020-11-04 RX ADMIN — HYDROMORPHONE HYDROCHLORIDE 0.5 MG: 1 INJECTION, SOLUTION INTRAMUSCULAR; INTRAVENOUS; SUBCUTANEOUS at 14:45

## 2020-11-04 RX ADMIN — PHENYLEPHRINE HYDROCHLORIDE 100 MCG: 10 INJECTION INTRAVENOUS at 11:48

## 2020-11-04 RX ADMIN — Medication 5 MG: at 12:06

## 2020-11-04 RX ADMIN — SODIUM CHLORIDE, POTASSIUM CHLORIDE, SODIUM LACTATE AND CALCIUM CHLORIDE: 600; 310; 30; 20 INJECTION, SOLUTION INTRAVENOUS at 11:19

## 2020-11-04 RX ADMIN — HYDROMORPHONE HYDROCHLORIDE 0.5 MG: 1 INJECTION, SOLUTION INTRAMUSCULAR; INTRAVENOUS; SUBCUTANEOUS at 19:59

## 2020-11-04 RX ADMIN — PHENYLEPHRINE HYDROCHLORIDE 150 MCG: 10 INJECTION INTRAVENOUS at 11:59

## 2020-11-04 RX ADMIN — LORAZEPAM 0.5 MG: 0.5 TABLET ORAL at 21:28

## 2020-11-04 RX ADMIN — PHENYLEPHRINE HYDROCHLORIDE 50 MCG: 10 INJECTION INTRAVENOUS at 13:26

## 2020-11-04 RX ADMIN — ATENOLOL 25 MG: 25 TABLET ORAL at 17:08

## 2020-11-04 RX ADMIN — PROPOFOL 200 MG: 10 INJECTION, EMULSION INTRAVENOUS at 11:26

## 2020-11-04 RX ADMIN — CYCLOBENZAPRINE HYDROCHLORIDE 2.5 MG: 5 TABLET, FILM COATED ORAL at 21:28

## 2020-11-04 RX ADMIN — DEXAMETHASONE SODIUM PHOSPHATE 10 MG: 4 INJECTION, SOLUTION INTRA-ARTICULAR; INTRALESIONAL; INTRAMUSCULAR; INTRAVENOUS; SOFT TISSUE at 11:44

## 2020-11-04 RX ADMIN — FENTANYL CITRATE 50 MCG: 0.05 INJECTION, SOLUTION INTRAMUSCULAR; INTRAVENOUS at 15:48

## 2020-11-04 RX ADMIN — Medication 5 MG: at 11:50

## 2020-11-04 RX ADMIN — OXYCODONE HYDROCHLORIDE 5 MG: 5 TABLET ORAL at 23:21

## 2020-11-04 RX ADMIN — PHENYLEPHRINE HYDROCHLORIDE 50 MCG: 10 INJECTION INTRAVENOUS at 13:20

## 2020-11-04 RX ADMIN — ACETAMINOPHEN 975 MG: 325 TABLET, FILM COATED ORAL at 17:08

## 2020-11-04 RX ADMIN — HYDROMORPHONE HYDROCHLORIDE 0.5 MG: 1 INJECTION, SOLUTION INTRAMUSCULAR; INTRAVENOUS; SUBCUTANEOUS at 15:02

## 2020-11-04 RX ADMIN — AMITRIPTYLINE HYDROCHLORIDE 10 MG: 10 TABLET, FILM COATED ORAL at 21:28

## 2020-11-04 RX ADMIN — REMIFENTANIL HYDROCHLORIDE 0.1 MCG/KG/MIN: 1 INJECTION, POWDER, LYOPHILIZED, FOR SOLUTION INTRAVENOUS at 11:30

## 2020-11-04 RX ADMIN — ONDANSETRON 4 MG: 2 INJECTION INTRAMUSCULAR; INTRAVENOUS at 14:39

## 2020-11-04 RX ADMIN — FENTANYL CITRATE 100 MCG: 50 INJECTION, SOLUTION INTRAMUSCULAR; INTRAVENOUS at 11:26

## 2020-11-04 RX ADMIN — Medication 5 MG: at 12:41

## 2020-11-04 RX ADMIN — PHENYLEPHRINE HYDROCHLORIDE 200 MCG: 10 INJECTION INTRAVENOUS at 12:38

## 2020-11-04 RX ADMIN — LIDOCAINE HYDROCHLORIDE 50 MG: 20 INJECTION, SOLUTION INFILTRATION; PERINEURAL at 11:26

## 2020-11-04 RX ADMIN — Medication 2.5 MG: at 11:53

## 2020-11-04 RX ADMIN — Medication 2.5 MG: at 11:43

## 2020-11-04 RX ADMIN — PHENYLEPHRINE HYDROCHLORIDE 100 MCG: 10 INJECTION INTRAVENOUS at 12:44

## 2020-11-04 RX ADMIN — HYDROMORPHONE HYDROCHLORIDE 0.5 MG: 1 INJECTION, SOLUTION INTRAMUSCULAR; INTRAVENOUS; SUBCUTANEOUS at 14:13

## 2020-11-04 RX ADMIN — SUCCINYLCHOLINE CHLORIDE 100 MG: 20 INJECTION, SOLUTION INTRAMUSCULAR; INTRAVENOUS; PARENTERAL at 11:27

## 2020-11-04 RX ADMIN — PROPOFOL 150 MCG/KG/MIN: 10 INJECTION, EMULSION INTRAVENOUS at 11:29

## 2020-11-04 RX ADMIN — PHENYLEPHRINE HYDROCHLORIDE 150 MCG: 10 INJECTION INTRAVENOUS at 11:50

## 2020-11-04 RX ADMIN — PHENYLEPHRINE HYDROCHLORIDE 50 MCG: 10 INJECTION INTRAVENOUS at 13:29

## 2020-11-04 RX ADMIN — Medication 5 MG: at 11:42

## 2020-11-04 RX ADMIN — PHENYLEPHRINE HYDROCHLORIDE 100 MCG: 10 INJECTION INTRAVENOUS at 12:06

## 2020-11-04 RX ADMIN — DOCUSATE SODIUM 100 MG: 100 CAPSULE, LIQUID FILLED ORAL at 21:28

## 2020-11-04 RX ADMIN — PHENYLEPHRINE HYDROCHLORIDE 100 MCG: 10 INJECTION INTRAVENOUS at 13:32

## 2020-11-04 RX ADMIN — PHENYLEPHRINE HYDROCHLORIDE 100 MCG: 10 INJECTION INTRAVENOUS at 12:32

## 2020-11-04 RX ADMIN — Medication 5 MG: at 11:47

## 2020-11-04 RX ADMIN — SODIUM CHLORIDE: 9 INJECTION, SOLUTION INTRAVENOUS at 17:08

## 2020-11-04 RX ADMIN — ONDANSETRON 4 MG: 2 INJECTION INTRAMUSCULAR; INTRAVENOUS at 14:09

## 2020-11-04 RX ADMIN — SODIUM CHLORIDE, POTASSIUM CHLORIDE, SODIUM LACTATE AND CALCIUM CHLORIDE: 600; 310; 30; 20 INJECTION, SOLUTION INTRAVENOUS at 14:04

## 2020-11-04 RX ADMIN — CLINDAMYCIN PHOSPHATE 900 MG: 900 INJECTION, SOLUTION INTRAVENOUS at 11:30

## 2020-11-04 RX ADMIN — MIDAZOLAM 2 MG: 1 INJECTION INTRAMUSCULAR; INTRAVENOUS at 11:19

## 2020-11-04 RX ADMIN — HYDROXYZINE HYDROCHLORIDE 25 MG: 25 TABLET, FILM COATED ORAL at 17:49

## 2020-11-04 RX ADMIN — PHENYLEPHRINE HYDROCHLORIDE 100 MCG: 10 INJECTION INTRAVENOUS at 11:53

## 2020-11-04 RX ADMIN — OXYCODONE HYDROCHLORIDE AND ACETAMINOPHEN 500 MG: 500 TABLET ORAL at 17:08

## 2020-11-04 RX ADMIN — PHENYLEPHRINE HYDROCHLORIDE 100 MCG: 10 INJECTION INTRAVENOUS at 11:42

## 2020-11-04 ASSESSMENT — LIFESTYLE VARIABLES: TOBACCO_USE: 1

## 2020-11-04 ASSESSMENT — MIFFLIN-ST. JEOR: SCORE: 1669.92

## 2020-11-04 ASSESSMENT — ACTIVITIES OF DAILY LIVING (ADL): ADLS_ACUITY_SCORE: 14

## 2020-11-04 NOTE — OR NURSING
Dr Parkinson at bedside- orders to place hard cervical collar- ordered and placed- OK to be transported to room per Dr Waite- transported to rm 5512-2 by Nurse Aidganesh Markham, report called to Mere VILLAGOMEZ-

## 2020-11-04 NOTE — ANESTHESIA CARE TRANSFER NOTE
Patient: Vanessa Hernandez    Procedure(s):  Anterior cervical discectomy and spinal cord decompression at cervical 4, cervical 5, and cervical 6 with bone bank graft and titanium plate    Diagnosis: Spinal cord compression (H) [G95.20]  Diagnosis Additional Information: No value filed.    Anesthesia Type:   General     Note:  Airway :Face Mask  Patient transferred to:PACU  Comments: Neuromuscular blockade not used after succinylcholine for intubation, spontaneous return of TOF 4/4 with sustained tetany, spontaneous respirations, adequate tidal volumes, followed commands to voice, oropharynx suctioned with soft flexible catheter, extubated atraumatically, airway patent after extubation.  Oxygen via facemask at 8 liters per minute to PACU. Oxygen tubing connected to wall O2 in PACU, SpO2, NiBP, and EKG monitors and alarms on and functioning, Emily Hugger warmer connected to patient gown, report on patient's clinical status given to PACU RN, RN questions answered.   Handoff Report: Identifed the Patient, Identified the Reponsible Provider, Reviewed the pertinent medical history, Discussed the surgical course, Reviewed Intra-OP anesthesia mangement and issues during anesthesia, Set expectations for post-procedure period and Allowed opportunity for questions and acknowledgement of understanding      Vitals: (Last set prior to Anesthesia Care Transfer)    CRNA VITALS  11/4/2020 1355 - 11/4/2020 1433      11/4/2020             Pulse:  78    SpO2:  99 %    Resp Rate (set):  10                Electronically Signed By: CASSANDRA Larson CRNA  November 4, 2020  2:33 PM

## 2020-11-04 NOTE — OP NOTE
Name of procedure: Anterior cervical discectomy, osteophytectomy and spinal cord decompression at C4-5 and C5-6; interbody arthrodesis C4-5 and C5-6 using allograft bone; placement of anterior cervical plate C4-C6, use the operating microscope    Preoperative diagnosis: High-grade spinal stenosis at C5-6 and C4-5 producing cervical myelopathy    Postoperative diagnosis: Same    Surgeon: Rio Parkinson MD    First Assistant: Natalio Lopez PA-C    Description of the procedure: Patient was brought to the operating room where she is placed under suitable general endotracheal anesthesia and subsequently positioned in the supine position.  SSEP and motor evoked potential monitoring was initiated and baseline studies were obtained which were reportedly symmetric and nearly normal.  Neck was slightly extended and her head was turned slightly toward the left side.  Right side of her neck was sterilely prepped and draped in the usual fashion and a right paramedian incision approximately 3 to 4 cm in length was made centered on the anterior border the sternocleidomastoid muscle at the level chosen with use of lateral fluoroscopic guidance.  Sharp dissection proceeded through the platysma layer, along the anterior border the sternocleidomastoid muscle, through the middle cervical fascia and directly onto the ventral surface of the cervical vertebra of C4, C5 and C6.  C4-5 level was verified and marked with lateral fluoroscopy and Milfay distracting retractors were placed along with 40 mm  retractors into the medial edges of the longus coli muscle bilaterally.  Operating microscope was brought into use and used throughout the remainder the procedure for visualization, illumination and microsurgical technique.  Complete discectomy was performed at the C4-5 level.  Posterior near bridging osteophytes were drilled to thin shell and removed along with the posterior longitudinal ligament from foramen to foramen.   Bilateral foraminotomies were performed and the adjacent cortical endplates were decorticated using a Midas Manjit drill.  Subsequently a 7 x 14 x 14 mm L ASR cornerstone graft was gently impacted into the intervertebral space under slight distraction.  Anterior osteophytes were drilled flush and the retractors removed downward to the C5-6 level where essentially the same technical procedure was performed.  Osteophytes were larger at C5-6 and the spinal canal appeared to be substantially tighter.  There were also more marked foraminal osteophytes present which were reduced bilaterally.  Once again following decortication of the adjacent cortical endplates of 7 x 14 x 14 mm L ASR cornerstone graft was impacted under gentle pressure into the intervertebral space under slight distraction.  Anterior osteophytes were drilled flush and a 42.5 mm Atlantis translational plate was selected sized and secured to the ventral aspect of C4, C5 and C6 using two 16mm titanium screws at C4 and 214 mm screws bilaterally at C5 and C6.  Center locking hub's were secured after removing the pushpins.  Retractors were removed and the retropharyngeal space was santhosh irrigated with Ancef containing irrigation.  Small amount of thrombin was left in the retropharyngeal space after achieving good hemostasis with bipolar cautery.  Wound was closed in layers using interrupted inverted 3-0 Vicryl suture with a running undyed 4-0 Vicryl in a subcuticular stitch through skin.  Sterile dressing was applied and patient was subsequently placed in a cervical collar, awakened extubated and transported to the recovery room in stable condition.    Before every significant step in the procedure intermittent motor evoked potential monitoring was utilized.  There was no reported change throughout the course of the operation.  EMG and somatosensory evoked potential monitoring was performed continuously and there were no findings reportedly.

## 2020-11-04 NOTE — ANESTHESIA PROCEDURE NOTES
Airway   Date/Time: 11/4/2020 11:28 AM   Patient location during procedure: OR    Staff -   Anesthesiologist:  Tristan Boo MD  CRNA: Bijal Weaver APRN CRNA  Performed By: CRNA    Consent for Airway   Urgency: elective    Indications and Patient Condition  Indications for airway management: mihai-procedural  Mask difficulty assessment: 0 - not attempted    Final Airway Details  Final airway type: endotracheal airway  Successful airway:ETT - single  Endotracheal Airway Details   ETT size (mm): 7.0  Cuffed: yes  Successful intubation technique: video laryngoscopy  Grade View of Cords: 1  Adjucts: stylet  Measured from: gums/teeth  Secured at (cm): 21  Secured with: pink tape  Bite block used: Soft    Post intubation assessment   Placement verified by: capnometry, equal breath sounds and chest rise   Number of attempts at approach: 1  Secured with:pink tape  Ease of procedure: easy  Dentition: Intact and Unchanged

## 2020-11-04 NOTE — ANESTHESIA PREPROCEDURE EVALUATION
Anesthesia Pre-Procedure Evaluation    Patient: Vanessa Hernandez   MRN: [id number] : 1955          Preoperative Diagnosis: Spinal cord compression (H) [G95.20]    Procedure(s):  Anterior cervical discectomy and spinal cord decompression at cervical 5, cervical 6, and cervical 7 with bone bank graft and titanium plate    Past Medical History:   Diagnosis Date     Familial exostosis      Fibromyalgia      HTN (hypertension)      Hyperglycemia      Hyperlipidemia LDL goal <100      Insomnia      Osteoarthritis      Periungual wart      Postmenopausal status      Spinal stenosis, lumbar region, with neurogenic claudication      Past Surgical History:   Procedure Laterality Date     CARPAL TUNNEL RELEASE RT/LT        SECTION       CHOLECYSTECTOMY       HC LAP,LYSIS OF ADHESIONS       LAPAROSCOPY       ORAL SURGERY IP CONSULT         Anesthesia Evaluation     . Pt has had prior anesthetic.     No history of anesthetic complications          ROS/MED HX    ENT/Pulmonary:     (+)tobacco use, Current use , . .   (-) sleep apnea   Neurologic:       Cardiovascular:     (+) Dyslipidemia, hypertension-range: controlled for last month since starting losartan, ---. : . . . :. .       METS/Exercise Tolerance:     Hematologic:         Musculoskeletal:   (+)  other musculoskeletal- fibromyalgia, cervical disc disease      GI/Hepatic:        (-) GERD   Renal/Genitourinary:         Endo:     (+) Obesity (BMI 40), .      Psychiatric:         Infectious Disease:         Malignancy:         Other:                          Physical Exam  Normal systems: cardiovascular, pulmonary and dental    Airway   Mallampati: III  TM distance: >3 FB  Neck ROM: limited  Comment: Extension limited by pain      Dental     Cardiovascular       Pulmonary             No results found for: WBC, HGB, HCT, PLT, CRP, SED, NA, POTASSIUM, CHLORIDE, CO2, BUN, CR, GLC, JOHN, PHOS, MAG, ALBUMIN, PROTTOTAL, ALT, AST, GGT, ALKPHOS, BILITOTAL, BILIDIRECT,  "LIPASE, AMYLASE, ADAMA, PTT, INR, FIBR, TSH, T4, T3, HCG, HCGS, CKTOTAL, CKMB, TROPN    Preop Vitals  BP Readings from Last 3 Encounters:   11/04/20 139/65   08/25/20 (!) 152/80    Pulse Readings from Last 3 Encounters:   11/04/20 69   08/25/20 63      Resp Readings from Last 3 Encounters:   11/04/20 16    SpO2 Readings from Last 3 Encounters:   11/04/20 96%   08/25/20 100%      Temp Readings from Last 1 Encounters:   11/04/20 36.4  C (97.6  F) (Temporal)    Ht Readings from Last 1 Encounters:   11/04/20 1.676 m (5' 6\")      Wt Readings from Last 1 Encounters:   11/04/20 109.3 kg (241 lb)    Estimated body mass index is 38.9 kg/m  as calculated from the following:    Height as of this encounter: 1.676 m (5' 6\").    Weight as of this encounter: 109.3 kg (241 lb).       Anesthesia Plan      History & Physical Review  History and physical reviewed and following examination; no interval change.    ASA Status:  3 .    NPO Status:  > 8 hours    Plan for General with Intravenous induction. Maintenance will be Balanced.    PONV prophylaxis:  Ondansetron (or other 5HT-3) and Dexamethasone or Solumedrol  Additional equipment: Videolaryngoscope Decadron 10mg, zofran  Videoscope to minimize extension        Postoperative Care  Postoperative pain management:  IV analgesics.      Consents  Anesthetic plan, risks, benefits and alternatives discussed with:  Patient..                 Tristan Boo MD  "

## 2020-11-04 NOTE — ANESTHESIA POSTPROCEDURE EVALUATION
Patient: Vanessa Hernandez    Procedure(s):  Anterior cervical discectomy and spinal cord decompression at cervical 4, cervical 5, and cervical 6 with bone bank graft and titanium plate    Diagnosis:Spinal cord compression (H) [G95.20]  Diagnosis Additional Information: No value filed.    Anesthesia Type:  General    Note:  Anesthesia Post Evaluation    Patient location during evaluation: PACU  Patient participation: Able to fully participate in evaluation  Level of consciousness: awake and alert  Pain management: adequate  Airway patency: patent  Cardiovascular status: acceptable  Respiratory status: acceptable  Hydration status: acceptable  PONV: none     Anesthetic complications: None          Last vitals:  Vitals:    11/04/20 0830 11/04/20 1428 11/04/20 1430   BP: 139/65 93/88 93/88   Pulse: 69 75 76   Resp: 16 13 21   Temp: 36.4  C (97.6  F) 35.9  C (96.7  F)    SpO2: 96% 99% 99%         Electronically Signed By: Vivek Waite MD  November 4, 2020  3:09 PM

## 2020-11-05 ENCOUNTER — APPOINTMENT (OUTPATIENT)
Dept: PHYSICAL THERAPY | Facility: CLINIC | Age: 65
End: 2020-11-05
Attending: NEUROLOGICAL SURGERY
Payer: OTHER GOVERNMENT

## 2020-11-05 ENCOUNTER — DOCUMENTATION ONLY (OUTPATIENT)
Dept: NEUROSURGERY | Facility: CLINIC | Age: 65
End: 2020-11-05

## 2020-11-05 VITALS
DIASTOLIC BLOOD PRESSURE: 59 MMHG | HEIGHT: 66 IN | BODY MASS INDEX: 38.73 KG/M2 | SYSTOLIC BLOOD PRESSURE: 114 MMHG | OXYGEN SATURATION: 97 % | RESPIRATION RATE: 16 BRPM | WEIGHT: 241 LBS | TEMPERATURE: 97.7 F | HEART RATE: 73 BPM

## 2020-11-05 LAB
GLUCOSE SERPL-MCNC: 114 MG/DL (ref 70–99)
HGB BLD-MCNC: 12.2 G/DL (ref 11.7–15.7)

## 2020-11-05 PROCEDURE — 250N000013 HC RX MED GY IP 250 OP 250 PS 637: Performed by: PHYSICIAN ASSISTANT

## 2020-11-05 PROCEDURE — 97161 PT EVAL LOW COMPLEX 20 MIN: CPT | Mod: GP | Performed by: PHYSICAL THERAPIST

## 2020-11-05 PROCEDURE — L0174 CERV SR 2PC THOR EXT PRE OTS: HCPCS

## 2020-11-05 PROCEDURE — 258N000003 HC RX IP 258 OP 636: Performed by: PHYSICIAN ASSISTANT

## 2020-11-05 PROCEDURE — 250N000013 HC RX MED GY IP 250 OP 250 PS 637: Performed by: NEUROLOGICAL SURGERY

## 2020-11-05 PROCEDURE — L1499 SPINAL ORTHOSIS NOS: HCPCS

## 2020-11-05 PROCEDURE — 82947 ASSAY GLUCOSE BLOOD QUANT: CPT | Performed by: PHYSICIAN ASSISTANT

## 2020-11-05 PROCEDURE — 36415 COLL VENOUS BLD VENIPUNCTURE: CPT | Performed by: PHYSICIAN ASSISTANT

## 2020-11-05 PROCEDURE — 97116 GAIT TRAINING THERAPY: CPT | Mod: GP | Performed by: PHYSICAL THERAPIST

## 2020-11-05 PROCEDURE — 97530 THERAPEUTIC ACTIVITIES: CPT | Mod: GP | Performed by: PHYSICAL THERAPIST

## 2020-11-05 PROCEDURE — 250N000011 HC RX IP 250 OP 636: Performed by: PHYSICIAN ASSISTANT

## 2020-11-05 PROCEDURE — 85018 HEMOGLOBIN: CPT | Performed by: PHYSICIAN ASSISTANT

## 2020-11-05 RX ORDER — HYDROXYZINE HYDROCHLORIDE 25 MG/1
25 TABLET, FILM COATED ORAL EVERY 6 HOURS PRN
Qty: 30 TABLET | Refills: 1 | Status: SHIPPED | OUTPATIENT
Start: 2020-11-05

## 2020-11-05 RX ORDER — NAPROXEN 500 MG/1
500 TABLET ORAL
Status: DISCONTINUED | OUTPATIENT
Start: 2020-11-05 | End: 2020-11-05 | Stop reason: HOSPADM

## 2020-11-05 RX ORDER — OXYCODONE HYDROCHLORIDE 5 MG/1
5-10 TABLET ORAL
Qty: 20 TABLET | Refills: 0 | Status: SHIPPED | OUTPATIENT
Start: 2020-11-05 | End: 2020-11-06

## 2020-11-05 RX ORDER — NAPROXEN 250 MG/1
500 TABLET ORAL EVERY MORNING
COMMUNITY

## 2020-11-05 RX ADMIN — ACETAMINOPHEN 975 MG: 325 TABLET, FILM COATED ORAL at 09:58

## 2020-11-05 RX ADMIN — OXYCODONE HYDROCHLORIDE AND ACETAMINOPHEN 500 MG: 500 TABLET ORAL at 09:58

## 2020-11-05 RX ADMIN — OXYCODONE HYDROCHLORIDE 10 MG: 5 TABLET ORAL at 02:34

## 2020-11-05 RX ADMIN — NAPROXEN 500 MG: 500 TABLET ORAL at 06:59

## 2020-11-05 RX ADMIN — ATENOLOL 25 MG: 25 TABLET ORAL at 09:58

## 2020-11-05 RX ADMIN — ACETAMINOPHEN 975 MG: 325 TABLET, FILM COATED ORAL at 00:21

## 2020-11-05 RX ADMIN — OXYCODONE HYDROCHLORIDE 10 MG: 5 TABLET ORAL at 06:44

## 2020-11-05 RX ADMIN — HYDROXYZINE HYDROCHLORIDE 25 MG: 25 TABLET, FILM COATED ORAL at 10:02

## 2020-11-05 RX ADMIN — HYDROMORPHONE HYDROCHLORIDE 0.5 MG: 1 INJECTION, SOLUTION INTRAMUSCULAR; INTRAVENOUS; SUBCUTANEOUS at 03:54

## 2020-11-05 RX ADMIN — OXYCODONE HYDROCHLORIDE 10 MG: 5 TABLET ORAL at 09:58

## 2020-11-05 RX ADMIN — OXYCODONE HYDROCHLORIDE 10 MG: 5 TABLET ORAL at 13:16

## 2020-11-05 RX ADMIN — DOCUSATE SODIUM 100 MG: 100 CAPSULE, LIQUID FILLED ORAL at 09:58

## 2020-11-05 RX ADMIN — SODIUM CHLORIDE: 9 INJECTION, SOLUTION INTRAVENOUS at 06:00

## 2020-11-05 RX ADMIN — OXYCODONE HYDROCHLORIDE 5 MG: 5 TABLET ORAL at 00:21

## 2020-11-05 RX ADMIN — LOSARTAN POTASSIUM 25 MG: 25 TABLET, FILM COATED ORAL at 09:58

## 2020-11-05 ASSESSMENT — ACTIVITIES OF DAILY LIVING (ADL)
ADLS_ACUITY_SCORE: 14

## 2020-11-05 NOTE — PROGRESS NOTES
11/05/20 0918   Quick Adds   Type of Visit Initial PT Evaluation   Living Environment   People in home alone   Current Living Arrangements house   Home Accessibility stairs within home   Number of Stairs, Within Home, Primary   (Tri level home with 2 and then 4 to get to living area. )   Stair Railings, Within Home, Primary railings safe and in good condition;railing on right side (ascending)   Transportation Anticipated car, drives self;family or friend will provide  (Sister in law will provide as long as she needs her to. )   Living Environment Comments Patient reports her sister in law will stay with her as long as she needs.    Self-Care   Usual Activity Tolerance good   Current Activity Tolerance moderate   Equipment Currently Used at Home none   Disability/Function   Hearing Difficulty or Deaf no   Concentrating, Remembering or Making Decisions Difficulty no   Difficulty Communicating no   Difficulty Eating/Swallowing no   Walking or Climbing Stairs Difficulty no   Dressing/Bathing Difficulty no   Toileting no   Doing Errands Independently Difficulty (such as shopping) yes   Fall history within last six months no   General Information   Onset of Illness/Injury or Date of Surgery 11/04/20   Referring Physician Natalio Lopez PA-C   Patient/Family Therapy Goals Statement (PT) To go home with sister in law assisting as needed.    Pertinent History of Current Problem (include personal factors and/or comorbidities that impact the POC) Per chart  Anterior cervical discectomy, osteophytectomy and spinal cord decompression at C4-5 and C5-6; interbody arthrodesis C4-5 and C5-6 using allograft bone; placement of anterior cervical plate C4-C6, use the operating microscope   Existing Precautions/Restrictions spinal  (Has cervical brace on. )   Cognition   Orientation Status (Cognition) oriented x 4   Affect/Mental Status (Cognition) WNL   Follows Commands (Cognition) WNL   Pain Assessment   Patient Currently  in Pain Yes, see Vital Sign flowsheet  (Minimal at cervical incision. )   Integumentary/Edema   Integumentary/Edema Comments Did not visualize incision. Brace on   Posture    Posture Not impaired   Range of Motion (ROM)   ROM Comment Neck range limited by cervical brace. Pro care brace   Strength   Manual Muscle Testing Quick Adds Strength WNL   Bed Mobility   Comment (Bed Mobility) Needed cues for technique and then able to perform without assist.    Transfers   Transfer Safety Comments Sit to stand with CGA.    Gait/Stairs (Locomotion)   Comment (Gait/Stairs) Initially needing to hold onto IV pole for balance due to being patients first times up. 10 feet to bathroom in room.    Balance   Balance Comments Good sitting balance and good static standing balance. Initially feeling unsteady with dynamic standing due to first time up.    Coordination   Coordination no deficits were identified   Muscle Tone   Muscle Tone no deficits were identified   Clinical Impression   Criteria for Skilled Therapeutic Intervention yes, treatment indicated   PT Diagnosis (PT) Impaired functional independence   Influenced by the following impairments Decreased neck ROM due to cervical collar.    Functional limitations due to impairments Needed cues for technique with bed mobility, assist for amb and for steps.    Clinical Presentation Stable/Uncomplicated   Clinical Presentation Rationale < 3 factors affecting mobility.    Clinical Decision Making (Complexity) low complexity   Therapy Frequency (PT) One time eval and treatment only   Planned Therapy Interventions (PT) bed mobility training;gait training;stair training;other (see comments)  (Education on dressing technique due to cervical collar. )   Clinical Impression Comments Neck brace needs to be assessed. Appears to be too small but therapist not familiar with this brace. Nurse aware.    PT Discharge Planning    PT Discharge Recommendation (DC Rec) home with assist   PT Rationale  for DC Rec By end of session, pateint was able to demonstrate incependence with functional mobility including going up and down steps. She may needs assist for donning pants and will need assist for IADL's. Patients sister in law is going to provide this assist.    PT Brief overview of current status  Patient is independent with bed mobility, transfers and with amb on level surface. Brace fit needs to be addressed.    Total Evaluation Time   Total Evaluation Time (Minutes) 10

## 2020-11-05 NOTE — PROGRESS NOTES
[facility]    Neurosurgery  Daily Post-Op Note    Assessment & Plan   Procedure(s):  Anterior cervical discectomy and spinal cord decompression at cervical 4, cervical 5, and cervical 6 with bone bank graft and titanium plate   -1 Day Post-Op  Doing well.  Pain well-controlled.  -radicular arm pain is gone, some residual soreness in her shoulders. This should improve with time.   Plan:  -Advance activity as tolerated  -Continue supportive and symptomatic treatment  -Aspen collar ordered. Ok to discharge home once she has it.       Luther Flores    Interval History   Stable.  Doing well.  Improving slowly.  Pain is reasonably controlled.  No fevers.     Physical Exam   Temp: 97.9  F (36.6  C) Temp src: Oral BP: 125/56 Pulse: 67   Resp: 16 SpO2: 98 % O2 Device: None (Room air) Oxygen Delivery: 2 LPM  Vitals:    11/04/20 0830   Weight: 109.3 kg (241 lb)     Vital Signs with Ranges  Temp:  [96.7  F (35.9  C)-98.1  F (36.7  C)] 97.9  F (36.6  C)  Pulse:  [65-76] 67  Resp:  [7-21] 16  BP: ()/(51-88) 125/56  SpO2:  [94 %-99 %] 98 %  I/O last 3 completed shifts:  In: 1200 [I.V.:1200]  Out: 1830 [Urine:1730; Blood:100]    Alert and oriented.  Moves all extremities equally.      Incision: CDI      Medications     sodium chloride 75 mL/hr at 11/05/20 0600        acetaminophen  975 mg Oral Q8H     amitriptyline  10 mg Oral At Bedtime     atenolol  25 mg Oral BID     docusate sodium  100 mg Oral BID     furosemide  20 mg Oral QAM AC     losartan  25 mg Oral QAM     naproxen  500 mg Oral QAM AC     sodium chloride (PF)  3 mL Intracatheter Q8H     vitamin C  500 mg Oral Daily           Luther Flores PA-C  [facility]surgery  [address]  [address] [place] [id number]    Tel [phone]  Pager [phone]

## 2020-11-05 NOTE — PLAN OF CARE
Patient is A&O x4. Neuro intact. Up independent in room. Denies chest pain or SOB. Pain managed with oxycodone. Dressing CDI, aspen collar in place, tolerating well. IV access discontinued. Reviewed discharge paperwork with pt and family. No further questions asked. Discharge pt home with belonging and medications.

## 2020-11-05 NOTE — UTILIZATION REVIEW
Admission Status; Secondary Review Determination   11/4/2020  8:09 AM    Under the authority of the Utilization Management Committee, the utilization review process indicated a secondary review on the above patient. The review outcome is based on review of the medical records, discussions with staff, and applying clinical experience noted on the date of the review.     (x) Outpatient Status with extended recovery is appropriate - This patient does not meet hospital inpatient criteria. If this patient's primary payer is Medicare and was admitted as an inpatient, Condition Code 44 should be used and patient status changed to outpatient recovery.    RATIONALE FOR DETERMINATION   64-year-old female admitted status post anterior cervical discectomy and spinal cord decompression at C4-6 with bone graft and titanium plate, postoperatively doing well, radicular pain improved.  Patient does have some residual sore numbness in her shoulders, increasing activity, Victoria collar ordered and okay to discharge once collar is available.  Patient does not meet criteria for inpatient stay, recommend change to outpatient status.  Communicated to JM Nolan   No documented complications or unexpected recovery. Patient can be safely  monitored for complications and recover in outpatient/extended recovery setting.     The severity of illness, intensity of service provided, expected LOS and risk for adverse outcome doesn't meet inpatient hospital admission.       The information on this document is developed by the utilization review team in order for the business office to ensure compliance. This only denotes the appropriateness of proper admission status and does not reflect the quality of care rendered.     Sincerely,     Aleksandar Willoughby MD   Physician Advisor   Utilization Management

## 2020-11-05 NOTE — PLAN OF CARE
A&Ox4. VSS on RA. Pt remains in bed until POD1. Voiding in bedpan. Cervical collar in place. Dressing CDI. Pain managed with oxy and dilaudid. Continue to monitor.

## 2020-11-05 NOTE — PLAN OF CARE
Pt arrived on the floor around 1630. A/OX4. Cervical colar in place. Dressing to dressing is CDI. Intermittent numbness reported on Right fingers. Pain managed with scheduled tylenol and dilaudid. Holt cath removed, due to void. Good appetite. Pt to remain in bed till POD1. Encouraged use of bedpan to void. Will continue to monitor.

## 2020-11-05 NOTE — PLAN OF CARE
OT: Orders received. Chart reviewed and discussed with PT.  Per PT report, pt has no OT needs so OT will not be indicated at this time. Will complete orders.

## 2020-11-05 NOTE — PHARMACY-ADMISSION MEDICATION HISTORY
Pharmacy Medication History  Admission medication history interview status for the 11/4/2020  admission is complete. See EPIC admission navigator for prior to admission medications     Location of Interview: Phone  Medication history sources: Patient  Medication history source reliability: Good  Adherence assessment: Good    Significant changes made to the medication list:  Patient reported that she takes Flexeril 1/2 tab daily at night (changed from prn to daily)      Additional medication history information:   None    Medication reconciliation completed by provider prior to medication history? Yes    Time spent in this activity: 20 mins      Prior to Admission medications    Medication Sig Last Dose Taking? Auth Provider   amitriptyline (ELAVIL) 10 MG tablet Take 10 mg by mouth At Bedtime  11/3/2020 at 2000 Yes Reported, Patient   atenolol (TENORMIN) 25 MG tablet Take 25 mg by mouth 2 times daily  11/4/2020 at 0500 Yes Reported, Patient   cyclobenzaprine (FLEXERIL) 5 MG tablet Take 2.5 mg by mouth At Bedtime (0.5 X 5 mg = 2.5 mg dose) 11/3/2020 at 2000 Yes Reported, Patient   famotidine (PEPCID) 10 MG tablet Take 10 mg by mouth daily as needed (upset stomach) 11/3/2020 at 2000 Yes Reported, Patient   furosemide (LASIX) 20 MG tablet Take 20 mg by mouth daily before breakfast 11/3/2020 at 0800 Yes Reported, Patient   glucosamine-chondroitin 500-400 MG CAPS per capsule Take 500 capsules by mouth daily 10/27/2020 at AM Yes Reported, Patient   HEMP OIL OR EXTRACT OR OTHER CBD CANNABINOID, NOT MEDICAL CANNABIS, Apply topically nightly as needed 11/3/2020 at 2000 Yes Reported, Patient   hydrOXYzine (ATARAX) 25 MG tablet Take 1 tablet (25 mg) by mouth every 6 hours as needed for itching  Yes Luther Flores PA-C   LORazepam (ATIVAN) 0.5 MG tablet Take 0.5 mg by mouth nightly as needed for anxiety 11/3/2020 at 2000 Yes Reported, Patient   losartan (COZAAR) 25 MG tablet Take 25 mg by mouth every morning  11/3/2020 at 0800  Yes Reported, Patient   naproxen (NAPROSYN) 250 MG tablet Take 500 mg by mouth every morning 10/27/2020 at AM Yes Unknown, Entered By History   oxyCODONE (ROXICODONE) 5 MG tablet Take 1-2 tablets (5-10 mg) by mouth every 3 hours as needed for moderate to severe pain  Yes Luther Flores PA-C   triamcinolone (KENALOG) 0.1 % external cream Apply 0.1 mg% topically 2 times daily as needed (rash)   at PRN Yes Reported, Patient   albuterol (PROAIR HFA/PROVENTIL HFA/VENTOLIN HFA) 108 (90 Base) MCG/ACT inhaler Inhale 2 puffs into the lungs every 4 hours as needed for shortness of breath / dyspnea or wheezing More than a month at PRN  Reported, Patient   vitamin C (ASCORBIC ACID) 500 MG tablet Take 500 mg by mouth daily More than a month at AM  Reported, Patient

## 2020-11-05 NOTE — PROVIDER NOTIFICATION
Paged spine PA, Luther, patient  in 512-2, Vanessa Lior, needs [facility] rec complete and wondering how long pt needs to wear aspen brace?

## 2020-11-06 DIAGNOSIS — M43.22 CERVICAL VERTEBRAL FUSION: ICD-10-CM

## 2020-11-06 RX ORDER — OXYCODONE HYDROCHLORIDE 5 MG/1
5-10 TABLET ORAL
Qty: 30 TABLET | Refills: 0 | Status: SHIPPED | OUTPATIENT
Start: 2020-11-06 | End: 2020-11-16

## 2020-11-06 NOTE — TELEPHONE ENCOUNTER
Patient calling for a refill of  oxycodone    DOS:11/4  Procedure: Anterior cervical discectomy and spinal cord decompression at cervical 4, cervical 5, and cervical 6 with bone bank graft and titanium plate    Surgeon:  Dr Parkinson     Current symptom(s): Pain is located in the shoulders radiating down arm  States it is sharp and radiating down her arms.  Pain is 8/10 at the 3 hours.     Current pain management:  Oxycodone 2 tabs every 3 hours.      Last fill:   Next visit: 11/18/2020    Medication pended for your approval, if appropriate. Pharmacy verified. [facility] in [place]    Any patient questions or concerns:     Informed patient request will be forwarded to care team.

## 2020-11-09 ENCOUNTER — TELEPHONE (OUTPATIENT)
Dept: NEUROSURGERY | Facility: CLINIC | Age: 65
End: 2020-11-09

## 2020-11-09 NOTE — TELEPHONE ENCOUNTER
Patient is s/p Anterior cervical discectomy and spinal cord decompression at cervical 4, cervical 5, and cervical 6 with bone bank graft and titanium plate with Dr. Parkinson on 11/4. She states that she feels like she is managing her pain well but she is still having pain mostly on the right side of her neck that radiates to her shoulder. She describes the pain as burning and dull. Informed her that since she is still so close to her surgery date, it is not uncommon to still have some pain. Encouraged the use of pain medications, ice and heat. She verbalized understanding and has no further questions or concerns at this time.

## 2020-11-13 DIAGNOSIS — M43.22 CERVICAL VERTEBRAL FUSION: ICD-10-CM

## 2020-11-13 NOTE — TELEPHONE ENCOUNTER
Reason For Call: Patient would like a refill of medication, she would like a call back at [phone].

## 2020-11-16 RX ORDER — OXYCODONE HYDROCHLORIDE 5 MG/1
5-10 TABLET ORAL
Qty: 30 TABLET | Refills: 0 | Status: SHIPPED | OUTPATIENT
Start: 2020-11-16 | End: 2020-11-19

## 2020-11-16 NOTE — TELEPHONE ENCOUNTER
"Patient calling for a refill of oxycodone    DOS: 11/04/20  Procedure:  Anterior cervical discectomy and spinal cord decompression at cervical 4, cervical 5, and cervical 6 with bone bank graft and titanium plate  Surgeon :  Dr Parkinson    Current symptom(s): Pain  is at  6 over back of head, bottom of neck, right shoulder blade and across bilateral shoulder.  Current pain management: Uses 1 tabs of oxycodone every  3 hours due to \" not much left\". Informed pt that sheshould try to take 2 tabs q 3 hours as prescribe. Also use tylenol, ice, and heating pack in between    Last fill: 11/6/20 # 30  Next visit: 11/18    Medication pended for your approval, if appropriate. Pharmacy verified.     Any patient questions or concerns:     Informed patient request will be forwarded to care team.   "

## 2020-11-17 ENCOUNTER — VIRTUAL VISIT (OUTPATIENT)
Dept: NEUROSURGERY | Facility: CLINIC | Age: 65
End: 2020-11-17
Payer: OTHER GOVERNMENT

## 2020-11-17 VITALS — BODY MASS INDEX: 36.96 KG/M2 | WEIGHT: 230 LBS | HEIGHT: 66 IN

## 2020-11-17 DIAGNOSIS — M43.22 CERVICAL VERTEBRAL FUSION: Primary | ICD-10-CM

## 2020-11-17 PROCEDURE — 999N000103 HC STATISTIC NO CHARGE FACILITY FEE: Mod: TEL

## 2020-11-17 PROCEDURE — 99207 PR NO CHARGE NURSE ONLY: CPT | Mod: TEL

## 2020-11-17 RX ORDER — METHOCARBAMOL 500 MG/1
500 TABLET, FILM COATED ORAL 4 TIMES DAILY PRN
Qty: 25 TABLET | Refills: 0 | Status: SHIPPED | OUTPATIENT
Start: 2020-11-17 | End: 2020-11-19

## 2020-11-17 ASSESSMENT — MIFFLIN-ST. JEOR: SCORE: 1620.02

## 2020-11-17 ASSESSMENT — PAIN SCALES - GENERAL: PAINLEVEL: SEVERE PAIN (6)

## 2020-11-17 NOTE — PROGRESS NOTES
"Telephone Encounter: Nurse Visit for Incision Check      S:  Patient is s/p Anterior cervical discectomy, osteophytectomy and spinal cord decompression at C4-5 and C5-6; interbody arthrodesis C4-5 and C5-6 using allograft bone; placement of anterior cervical plate C4-C6 on 11/4/20 with Dr. Parkinson. Patient states she is doing okay.  She reports constant burning across her shoulders. Some pain at the base of her head radiating into her shoulders. Rates the pain 5-6 out of 10.  This is usually about 1 hour after taking the oxycodone.   She denies tingling.  Has some numbness in her right 3rd and 4th finger.    For pain management, patient is taking oxycodone 1 tab every 3 hours.  She has also tried flexeril 1/2 tab at bedtime without relief.  O:  Patient assessed incision while discussing over the phone. She reports incision \"looks good\".  Denies any redness, swelling, drainage, dehiscence, warmth or fevers.   A: S/p  Anterior cervical discectomy, osteophytectomy and spinal cord decompression at C4-5 and C5-6; interbody arthrodesis C4-5 and C5-6 using allograft bone; placement of anterior cervical plate C4-C6 on 11/4/20 with Dr. Parkinson. Pt is recovering well overall.  P:    - Keep your incision clean and dry at all times. No bathing, swimming, or submerging in water until incision is well healed.  Call clinic or go to Emergency Room if you develop any new pain, drainage, swelling, or fever.  - No lifting greater than 10-15 pounds. No bending or twisting, or over head reaching.   - Return on 12/21/20 as scheduled for post op follow-up with an xray prior.      -Please call clinic with any further questions or concerns: [phone]     Tati Arroyo RN  [facility]surgery   [address] [address]     Tel [phone]       "

## 2020-11-17 NOTE — NURSING NOTE
"Vanessa Hernandez is a 64 year old female who presents for:  Chief Complaint   Patient presents with     Neurologic Problem     cervical 2 week incision check        Initial Vitals:  Ht 5' 6\" (1.676 m)   Wt 230 lb (104.3 kg)   BMI 37.12 kg/m   Estimated body mass index is 37.12 kg/m  as calculated from the following:    Height as of this encounter: 5' 6\" (1.676 m).    Weight as of this encounter: 230 lb (104.3 kg).. Body surface area is 2.2 meters squared. BP completed using cuff size: NA (Not Taken)  Severe Pain (6)    Nursing Comments: see chief complaint    Steve Matamoros, MUNIRA    "

## 2020-11-17 NOTE — PATIENT INSTRUCTIONS
Instructions for Patient    Keep your incision clean and dry at all times.     No bathing, swimming, or submerging in water until incision is well healed.      No lifting greater than 10-15 pounds. No bending, twisting, or overhead reaching.    Continue to wear brace as directed by your surgeon    Return in 4 weeks for follow up appointment and xray prior    Weaning from narcotic pain medications    When it is time, start weaning by extending the time between doses.     For example, if you're taking 2 tabs every 4 hours, spread it out to 2 tabs over 4.5, 5, 6 hours.     At that point you can certainly cut down to 1 tab, then wean to an as needed basis until completely done with them.    For refills, please call our clinic. A nurse will call you back to obtain a pain assessment. Please call 1-2 business days before you run out so we can ensure there is a provider available at the location you prefer for .    Call the clinic or go to Emergency Room if you develop any new pain, drainage, swelling, or fever. Go to the Emergency Room if sudden onset of severe headache, weakness, confusion, change in level of consciousness, pain, or loss of movement.    Post-operative appointments    Arrive 30 minutes before your 6 week post op, 3 months post op, 6 months post op, 1 year post-op appointments to allow time for an x-ray before each.    Please call clinic with any further questions or concerns      [facility]  [address] [address]  T:  [phone]  F:  [phone]

## 2020-11-19 DIAGNOSIS — M43.22 CERVICAL VERTEBRAL FUSION: ICD-10-CM

## 2020-11-19 RX ORDER — OXYCODONE HYDROCHLORIDE 5 MG/1
5-10 TABLET ORAL EVERY 6 HOURS PRN
Qty: 25 TABLET | Refills: 0 | Status: SHIPPED | OUTPATIENT
Start: 2020-11-19 | End: 2020-11-24

## 2020-11-19 RX ORDER — METHOCARBAMOL 500 MG/1
500 TABLET, FILM COATED ORAL 4 TIMES DAILY PRN
Qty: 25 TABLET | Refills: 0 | Status: SHIPPED | OUTPATIENT
Start: 2020-11-19 | End: 2020-12-09

## 2020-11-19 NOTE — TELEPHONE ENCOUNTER
Patient calling for a refill of oxycodone     DOS: 11/4/20  Procedure:  Anterior cervical discectomy and spinal cord decompression at cervical 4, cervical 5, and cervical 6 with bone bank graft and titanium plate  Surgeon:Dr Parkinson    Current symptom(s):  Pain is 6 over the base of head, shoulder blade and cross shoulder. Right side is worse than left side  Current pain management: Uses 10 tabs of oxycodone per day. Only has 6 left today. Uses tylenol in between as well as ice/ heat    Last fill: 11/16/20  Next visit: 12/14/20    Medication pended for your approval, if appropriate. Pharmacy verified.     Any patient questions or concerns:     Informed patient request will be forwarded to care team.

## 2020-11-19 NOTE — TELEPHONE ENCOUNTER
Prescriptions approved, she needs to decrease the frequency with which she is taking oxycodone.  I wrote her a new prescription for every 6 hours as needed.

## 2020-11-24 DIAGNOSIS — M43.22 CERVICAL VERTEBRAL FUSION: ICD-10-CM

## 2020-11-24 RX ORDER — OXYCODONE HYDROCHLORIDE 5 MG/1
5-10 TABLET ORAL EVERY 6 HOURS PRN
Qty: 25 TABLET | Refills: 0 | Status: SHIPPED | OUTPATIENT
Start: 2020-11-24 | End: 2020-11-30

## 2020-11-24 NOTE — TELEPHONE ENCOUNTER
"Patient calling for a refill of oxycodone.     DOS: 11/4/20  Procedure: Anterior cervical discectomy and spinal cord decompression at cervical 4, cervical 5, and cervical 6 with bone bank graft and titanium plate  Surgeon: Dr Coyne    Current symptom(s): Pain is at 5 over neck, to top of  bilateral shoulder and across shoulder blades. Feel like \" pinching nerve.    Current pain management: Uses 5 of oxycodone per days.  No more oxycodone left. Uses tylenol, robaxin in between as well as ice/ heating pad    Last fill: 11/19/20 # 25  Next visit: 12/14/20    Medication pended for your approval, if appropriate. Pharmacy verified.     Any patient questions or concerns:     Informed patient request will be forwarded to care team.   "

## 2020-12-09 DIAGNOSIS — M43.22 CERVICAL VERTEBRAL FUSION: ICD-10-CM

## 2020-12-09 RX ORDER — OXYCODONE HYDROCHLORIDE 5 MG/1
5-10 TABLET ORAL EVERY 6 HOURS PRN
Qty: 25 TABLET | Refills: 0 | Status: SHIPPED | OUTPATIENT
Start: 2020-12-09 | End: 2020-12-14

## 2020-12-09 RX ORDER — METHOCARBAMOL 500 MG/1
500 TABLET, FILM COATED ORAL 4 TIMES DAILY PRN
Qty: 25 TABLET | Refills: 0 | Status: SHIPPED | OUTPATIENT
Start: 2020-12-09

## 2020-12-09 NOTE — TELEPHONE ENCOUNTER
Reason For Call: Patient is requesting a medication refill, she would like a call back at [phone].

## 2020-12-09 NOTE — TELEPHONE ENCOUNTER
Patient calling for a refill of  ROBAXIN  OXYCODONE    DOS: 11/4/20  Procedure:   Anterior cervical discectomy and spinal cord decompression at cervical 4, cervical 5, and cervical 6 with bone bank graft and titanium plate      Surgeon: Dr Parkinson    Current symptom(s): Pain is at 4 over neck, bilateral shoulder blade, and bicep. Pain is described as sharp.  Current pain management: Takes 3 tabs of oxycodone per day. No more oxy left. Uses ice/heat as well as Tylenol. Not using Ibuprofen due to 6 week post op window     Last fill: 11/19/20 # 25 for Robaxin, 11/30 # 25 for Oxycodone  Next visit: 12/14/20    Medication pended for your approval, if appropriate. Pharmacy verified.     Any patient questions or concerns:     Informed patient request will be forwarded to care team.

## 2020-12-14 ENCOUNTER — OFFICE VISIT (OUTPATIENT)
Dept: NEUROSURGERY | Facility: CLINIC | Age: 65
End: 2020-12-14
Attending: NURSE PRACTITIONER
Payer: OTHER GOVERNMENT

## 2020-12-14 ENCOUNTER — ANCILLARY PROCEDURE (OUTPATIENT)
Dept: GENERAL RADIOLOGY | Facility: CLINIC | Age: 65
End: 2020-12-14
Attending: PHYSICIAN ASSISTANT
Payer: OTHER GOVERNMENT

## 2020-12-14 ENCOUNTER — TELEPHONE (OUTPATIENT)
Dept: NEUROSURGERY | Facility: CLINIC | Age: 65
End: 2020-12-14

## 2020-12-14 VITALS
TEMPERATURE: 98.5 F | BODY MASS INDEX: 37.45 KG/M2 | HEART RATE: 69 BPM | OXYGEN SATURATION: 97 % | SYSTOLIC BLOOD PRESSURE: 114 MMHG | HEIGHT: 66 IN | DIASTOLIC BLOOD PRESSURE: 65 MMHG | WEIGHT: 233 LBS

## 2020-12-14 DIAGNOSIS — M43.22 CERVICAL VERTEBRAL FUSION: ICD-10-CM

## 2020-12-14 DIAGNOSIS — Z98.1 S/P CERVICAL SPINAL FUSION: Primary | ICD-10-CM

## 2020-12-14 PROCEDURE — 72040 X-RAY EXAM NECK SPINE 2-3 VW: CPT | Performed by: RADIOLOGY

## 2020-12-14 PROCEDURE — G0463 HOSPITAL OUTPT CLINIC VISIT: HCPCS

## 2020-12-14 PROCEDURE — 99024 POSTOP FOLLOW-UP VISIT: CPT | Performed by: NURSE PRACTITIONER

## 2020-12-14 RX ORDER — OXYCODONE HYDROCHLORIDE 5 MG/1
5-10 TABLET ORAL EVERY 6 HOURS PRN
Qty: 25 TABLET | Refills: 0 | Status: SHIPPED | OUTPATIENT
Start: 2020-12-14 | End: 2021-02-03

## 2020-12-14 ASSESSMENT — MIFFLIN-ST. JEOR: SCORE: 1628.63

## 2020-12-14 ASSESSMENT — PAIN SCALES - GENERAL: PAINLEVEL: MODERATE PAIN (5)

## 2020-12-14 NOTE — TELEPHONE ENCOUNTER
Spoke with Elaine Shirley CNP: She is going to discuss soft collar with Dr. Parkinson and then will either send message to RN pool or call the patient directly.     Pt verbalized understanding.

## 2020-12-14 NOTE — NURSING NOTE
"Vanessa Hernandez is a 65 year old female who presents for:  Chief Complaint   Patient presents with     Neurologic Problem     6 week post op, cervical vertebral fusion.         Initial Vitals:  /65 (BP Location: Right arm, Patient Position: Sitting, Cuff Size: Adult Large)   Pulse 69   Temp 98.5  F (36.9  C) (Oral)   Ht 5' 6\" (1.676 m)   Wt 233 lb (105.7 kg)   SpO2 97%   BMI 37.61 kg/m   Estimated body mass index is 37.61 kg/m  as calculated from the following:    Height as of this encounter: 5' 6\" (1.676 m).    Weight as of this encounter: 233 lb (105.7 kg).. Body surface area is 2.22 meters squared. BP completed using cuff size: large  Moderate Pain (5)    Nursing Comments: pain radiates into both shoulders    Steve aMtamoros CMA    "

## 2020-12-14 NOTE — PROGRESS NOTES
"[facility] Neurosurgery Follow-Up:     HPI: 6 weeks s/p anterior cervical discectomy and spinal cord decompression at C4-6 with bone bank graft and titanium plate with Dr. Parkinson. Patient reports occasional posterior neck pain and bilateral shoulder pain. Improvement in pre op numbness and weakness. Currently using Oxycodone and Robaxin for pain management. Continues to wear Aspen at all times. She would like to extend her time off work for another 6 weeks. She does not feel quite ready to RTW at this time.     Current pain: 5/10    Exam:  Constitutional:  Alert, well nourished, NAD.  HEENT: Normocephalic, atraumatic.   Pulm:  Without shortness of breath   CV:  No pitting edema of BLE.     Vital Signs: /65 (BP Location: Right arm, Patient Position: Sitting, Cuff Size: Adult Large)   Pulse 69   Temp 98.5  F (36.9  C) (Oral)   Ht 5' 6\" (1.676 m)   Wt 233 lb (105.7 kg)   SpO2 97%   BMI 37.61 kg/m      Neurological:  Awake  Alert  Oriented x 3  Motor exam:  Shoulder Abduction:  Right:  5/5    Left:  5/5  Biceps:                      Right:  5/5    Left:  5/5  Triceps:                     Right:  5/5    Left:  5/5  Wrist Extensors:       Right:  5/5    Left:  5/5  Wrist Flexors:           Right:  5/5    Left:  5/5  Intrinsics:                  Right:  5/5    Left:  5/5    Able to spontaneously move U/E bilaterally  Sensation intact throughout all U/E dermatomes    Incision: Well healed     Imaging: AP and lateral cervical films reveal stable and intact hardware.     Assessment/Plan:   6 weeks s/p anterior cervical discectomy and spinal cord decompression at C4-6 with bone bank graft and titanium plate with Dr. Parkinson. Patient reports occasional posterior neck pain and bilateral shoulder pain. Improvement in pre op numbness and weakness. XR reveals stable hardware.     - Continue with routine post operative care plan   - Okay to remain off work until follow-up visit in 6 weeks   - Continue to wear " [place] collar   - May gradually increase activity and lifting restriction to 15-20 pounds, as tolerated  - Oxycodone refill sent to pharmacy. Continue to wean off medication.   - Follow up in 6 weeks with xray prior   - Call our clinic for any incisional concerns, increased pain, new symptoms, or any other post operative questions or concerns    Patient voiced understanding and agreement with this plan.      Elaine Shirley CNP  [facility] Neurosurgery  [address] [address]  Tel [phone]  Pager [phone]

## 2020-12-14 NOTE — LETTER
"    12/14/2020         RE: Vanessa Hernandez  [address] [place] [facility] [address]        Dear Colleague,    Thank you for referring your patient, Vanessa Hernandez, to the [facility] NEUROSURGERY CLINIC [place]. Please see a copy of my visit note below.    [facility] Neurosurgery Follow-Up:     HPI: 6 weeks s/p anterior cervical discectomy and spinal cord decompression at C4-6 with bone bank graft and titanium plate with Dr. Parkinson. Patient reports occasional posterior neck pain and bilateral shoulder pain. Improvement in pre op numbness and weakness. Currently using Oxycodone and Robaxin for pain management. Continues to wear Aspen at all times. She would like to extend her time off work for another 6 weeks. She does not feel quite ready to RTW at this time.     Current pain: 5/10    Exam:  Constitutional:  Alert, well nourished, NAD.  HEENT: Normocephalic, atraumatic.   Pulm:  Without shortness of breath   CV:  No pitting edema of BLE.     Vital Signs: /65 (BP Location: Right arm, Patient Position: Sitting, Cuff Size: Adult Large)   Pulse 69   Temp 98.5  F (36.9  C) (Oral)   Ht 5' 6\" (1.676 m)   Wt 233 lb (105.7 kg)   SpO2 97%   BMI 37.61 kg/m      Neurological:  Awake  Alert  Oriented x 3  Motor exam:  Shoulder Abduction:  Right:  5/5    Left:  5/5  Biceps:                      Right:  5/5    Left:  5/5  Triceps:                     Right:  5/5    Left:  5/5  Wrist Extensors:       Right:  5/5    Left:  5/5  Wrist Flexors:           Right:  5/5    Left:  5/5  Intrinsics:                  Right:  5/5    Left:  5/5    Able to spontaneously move U/E bilaterally  Sensation intact throughout all U/E dermatomes    Incision: Well healed     Imaging: AP and lateral cervical films reveal stable and intact hardware.     Assessment/Plan:   6 weeks s/p anterior cervical discectomy and spinal cord decompression at C4-6 with bone bank graft and titanium plate with Dr. Parkinson. Patient reports " occasional posterior neck pain and bilateral shoulder pain. Improvement in pre op numbness and weakness. XR reveals stable hardware.     - Continue with routine post operative care plan   - Okay to remain off work until follow-up visit in 6 weeks   - Continue to wear Aspen collar   - May gradually increase activity and lifting restriction to 15-20 pounds, as tolerated  - Oxycodone refill sent to pharmacy. Continue to wean off medication.   - Follow up in 6 weeks with xray prior   - Call our clinic for any incisional concerns, increased pain, new symptoms, or any other post operative questions or concerns    Patient voiced understanding and agreement with this plan.      Elaine Shirley CNP  [facility] Neurosurgery  [address] [address]  Tel [phone]  Pager [phone]          Again, thank you for allowing me to participate in the care of your patient.        Sincerely,        Elaine Shirley, YOLANDA

## 2020-12-14 NOTE — PATIENT INSTRUCTIONS
- Continue with routine post operative care plan   - Okay to remain off work until follow-up visit in 6 weeks   - Continue to wear Aspen collar   - May gradually increase activity and lifting restriction to 15-20 pounds, as tolerated  - Oxycodone refill sent to pharmacy. Continue to wean off medication.   - Follow up in 6 weeks with xray prior   - Call our clinic for any incisional concerns, increased pain, new symptoms, or any other post operative questions or concerns

## 2020-12-14 NOTE — TELEPHONE ENCOUNTER
Reason For Call: Patient would like a call back to discuss wearing a soft collar at night, she can be reached at [phone].

## 2020-12-15 ENCOUNTER — DOCUMENTATION ONLY (OUTPATIENT)
Dept: NEUROSURGERY | Facility: CLINIC | Age: 65
End: 2020-12-15

## 2020-12-15 NOTE — PROGRESS NOTES
Filled out FMLA forms giving patient 6 more weeks off of work for recovery per Elaine, [facility]'s signature. Mailed back to patient.     Steve Matamoros CMA on 12/15/2020 at 9:10 AM

## 2020-12-15 NOTE — TELEPHONE ENCOUNTER
"Per Elaine Shirley NP: \"Can someone please call patient and inform her that I reviewed with Tim.  Okay to start weaning out of collar. Wean over two weeks please. If she wants to transition to a soft collar for her comfort, I'm okay with that too.    Called the Pt with the above information.  She will slowly wean and off the [place] collar. If she feels she needs a soft collar the patient will call for an order. Pt verbalized understanding.    "

## 2020-12-16 NOTE — PROGRESS NOTES
Emailed FMLA to [email or web address]. Place copy in medical record.

## 2021-01-04 ENCOUNTER — HEALTH MAINTENANCE LETTER (OUTPATIENT)
Age: 66
End: 2021-01-04

## 2021-01-18 ENCOUNTER — TELEPHONE (OUTPATIENT)
Dept: NEUROSURGERY | Facility: CLINIC | Age: 66
End: 2021-01-18

## 2021-01-18 NOTE — TELEPHONE ENCOUNTER
Spoke with the patient about updating her workability form to reflect activity restrictions and returning to work starting with 3 hours per day and advancing as tolerated.     Form updated and emailed to patient at [email or web address] and submitted for scanning.     Virginia Tilley RN

## 2021-01-18 NOTE — TELEPHONE ENCOUNTER
Reason For Call: Patient is requesting a call back, she would like to discuss returning back to work, she can be reached at [phone].

## 2021-02-03 ENCOUNTER — OFFICE VISIT (OUTPATIENT)
Dept: NEUROSURGERY | Facility: CLINIC | Age: 66
End: 2021-02-03
Attending: NURSE PRACTITIONER
Payer: OTHER GOVERNMENT

## 2021-02-03 ENCOUNTER — ANCILLARY PROCEDURE (OUTPATIENT)
Dept: GENERAL RADIOLOGY | Facility: CLINIC | Age: 66
End: 2021-02-03
Attending: NURSE PRACTITIONER
Payer: OTHER GOVERNMENT

## 2021-02-03 ENCOUNTER — DOCUMENTATION ONLY (OUTPATIENT)
Dept: NEUROSURGERY | Facility: CLINIC | Age: 66
End: 2021-02-03

## 2021-02-03 VITALS
BODY MASS INDEX: 38.89 KG/M2 | OXYGEN SATURATION: 98 % | DIASTOLIC BLOOD PRESSURE: 79 MMHG | SYSTOLIC BLOOD PRESSURE: 126 MMHG | HEIGHT: 66 IN | TEMPERATURE: 98.4 F | WEIGHT: 242 LBS | HEART RATE: 77 BPM

## 2021-02-03 DIAGNOSIS — Z98.1 S/P CERVICAL SPINAL FUSION: ICD-10-CM

## 2021-02-03 DIAGNOSIS — Z98.1 S/P CERVICAL SPINAL FUSION: Primary | ICD-10-CM

## 2021-02-03 DIAGNOSIS — M43.22 CERVICAL VERTEBRAL FUSION: ICD-10-CM

## 2021-02-03 PROCEDURE — 99024 POSTOP FOLLOW-UP VISIT: CPT | Performed by: NURSE PRACTITIONER

## 2021-02-03 PROCEDURE — 72040 X-RAY EXAM NECK SPINE 2-3 VW: CPT | Performed by: RADIOLOGY

## 2021-02-03 RX ORDER — OXYCODONE HYDROCHLORIDE 5 MG/1
5 TABLET ORAL EVERY 6 HOURS PRN
Qty: 12 TABLET | Refills: 0 | Status: SHIPPED | OUTPATIENT
Start: 2021-02-03

## 2021-02-03 ASSESSMENT — MIFFLIN-ST. JEOR: SCORE: 1669.45

## 2021-02-03 ASSESSMENT — PAIN SCALES - GENERAL: PAINLEVEL: SEVERE PAIN (6)

## 2021-02-03 NOTE — LETTER
"    2/3/2021         RE: Vanessa Hernandez  [address]  [place] [address]        Dear Colleague,    Thank you for referring your patient, Vanessa Hernandez, to the [facility] NEUROSURGERY CLINIC [place]. Please see a copy of my visit note below.    [facility] Neurosurgery  Neurosurgery Followup:    HPI: 12 weeks s/p cervical discectomy and spinal cord decompression at C4-6 with titanium plate with Dr. Parkinson. She reports she was feeling well up until 1 week ago. No injury. She reports her left shoulder and arm pain has returned. She denies paresthesias and weakness. Incision intact.     Medical, surgical, family, and social history unchanged since prior exam.    Exam:  Constitutional:  Alert, well nourished, NAD.  HEENT: Normocephalic, atraumatic.   Pulm:  Without shortness of breath   CV:  No pitting edema of BLE.     Vital Signs:  /79   Pulse 77   Temp 98.4  F (36.9  C)   Ht 5' 6\" (1.676 m)   Wt 242 lb (109.8 kg)   SpO2 98%   BMI 39.06 kg/m      Neurological:  Awake  Alert  Oriented x 3  Motor exam:     Shoulder Abduction:  Right:  5/5    Left:  5/5  Biceps:                      Right:  5/5    Left:  5/5  Triceps:                     Right:  5/5    Left:  5/5  Wrist Extensors:       Right:  5/5    Left:  5/5  Wrist Flexors:           Right:  5/5    Left:  5/5  Intrinsics:                  Right:  5/5    Left:  5/5     Able to spontaneously move U/E bilaterally  Sensation intact throughout all U/E dermatomes    Incisions:  Healing nicely.    Imaging:  AP and lateral films reveal intact and stable hardware.     A/P: 12 weeks s/p cervical discectomy and spinal cord decompression at C4-6 with titanium plate with Dr. Parkinson. Plan for course of PT. She was instructed to contact the clinic if symptoms persist or worsen. She verbalized understanding and agreement.    Patient Instructions   - May increase lifting restriction and activity as tolerated    -  Physical therapy ordered. Please " contact them to schedule.    - Follow up in 3 months with xray prior     - Call the clinic at [phone] for increased pain or any other questions and concerns.    Shawna Salamanca CNP  [address] [address]  Tel [phone]  Fax [phone]        Again, thank you for allowing me to participate in the care of your patient.        Sincerely,        Shawna Salamanca, NP

## 2021-02-03 NOTE — NURSING NOTE
"Vanessa Hernandez is a 65 year old female who presents for:  Chief Complaint   Patient presents with     Follow Up     3 mo. f/u C4-6         Initial Vitals:  /79   Pulse 77   Temp 98.4  F (36.9  C)   Ht 5' 6\" (1.676 m)   Wt 242 lb (109.8 kg)   SpO2 98%   BMI 39.06 kg/m   Estimated body mass index is 39.06 kg/m  as calculated from the following:    Height as of this encounter: 5' 6\" (1.676 m).    Weight as of this encounter: 242 lb (109.8 kg).. Body surface area is 2.26 meters squared. BP completed using cuff size: large  Severe Pain (6)    Nursing Comments: Patient presents for 3 mo. f/u C4-6     Tom Banerjee MA  "

## 2021-02-03 NOTE — PROGRESS NOTES
Faxed PT referral February 3, 2021 to fax number [phone]    Right Fax confirmed at 4:37 PM    Deysi Lema MA

## 2021-02-03 NOTE — PATIENT INSTRUCTIONS
- May increase lifting restriction and activity as tolerated    -  Physical therapy ordered. Please contact them to schedule.    - Follow up in 3 months with xray prior     - Call the clinic at [phone] for increased pain or any other questions and concerns.

## 2021-02-03 NOTE — PROGRESS NOTES
"[facility] Neurosurgery  Neurosurgery Followup:    HPI: 12 weeks s/p cervical discectomy and spinal cord decompression at C4-6 with titanium plate with Dr. Parkinson. She reports she was feeling well up until 1 week ago. No injury. She reports her left shoulder and arm pain has returned. She denies paresthesias and weakness. Incision intact.     Medical, surgical, family, and social history unchanged since prior exam.    Exam:  Constitutional:  Alert, well nourished, NAD.  HEENT: Normocephalic, atraumatic.   Pulm:  Without shortness of breath   CV:  No pitting edema of BLE.     Vital Signs:  /79   Pulse 77   Temp 98.4  F (36.9  C)   Ht 5' 6\" (1.676 m)   Wt 242 lb (109.8 kg)   SpO2 98%   BMI 39.06 kg/m      Neurological:  Awake  Alert  Oriented x 3  Motor exam:     Shoulder Abduction:  Right:  5/5    Left:  5/5  Biceps:                      Right:  5/5    Left:  5/5  Triceps:                     Right:  5/5    Left:  5/5  Wrist Extensors:       Right:  5/5    Left:  5/5  Wrist Flexors:           Right:  5/5    Left:  5/5  Intrinsics:                  Right:  5/5    Left:  5/5     Able to spontaneously move U/E bilaterally  Sensation intact throughout all U/E dermatomes    Incisions:  Healing nicely.    Imaging:  AP and lateral films reveal intact and stable hardware.     A/P: 12 weeks s/p cervical discectomy and spinal cord decompression at C4-6 with titanium plate with Dr. Parkinson. Plan for course of PT. She was instructed to contact the clinic if symptoms persist or worsen. She verbalized understanding and agreement.    Patient Instructions   - May increase lifting restriction and activity as tolerated    -  Physical therapy ordered. Please contact them to schedule.    - Follow up in 3 months with xray prior     - Call the clinic at [phone] for increased pain or any other questions and concerns.    Shawna Salamanca, CNP  [facility] Neurosurgery  [address] [place] [address] " [address]  [place] [address]  Tel [phone]  Fax [phone]

## 2021-02-23 ENCOUNTER — TELEPHONE (OUTPATIENT)
Dept: NEUROSURGERY | Facility: CLINIC | Age: 66
End: 2021-02-23

## 2021-02-23 NOTE — LETTER
[facility]  Neurosurgery Clinic  [address] [place] [address]  [id number]           February 25, 201     To Whom it May Concern,        Vanessa is being seen at our clinic for post-operative follow up care. She is able to return to work as tolerated with the restrictions of:     -Limit pushing, pulling, or lifting to 20 lbs and may gradually increase as tolerated   -3 hour shifts X 2 weeks and then increase as tolerated.      Vanessa will be re-evaluated at the next follow up visit. Please call our clinic with questions or concerns: [phone]        Sincerely,     Shawna Salamanca DNP        (electronically signed)

## 2021-02-23 NOTE — TELEPHONE ENCOUNTER
Spoke to pt about returning to work. Letter documented and emailed to [email or web address]

## 2021-02-23 NOTE — LETTER
[facility]  Neurosurgery Clinic  [address] [place] [address]  [id number]        February 23, 201    To Whom it May Concern,      Vanessa is being seen at our clinic for post-operative follow up care. She is able to return to work as tolerated with the restrictions of:    -No heavy lifting greater than 20 pounds   -Limit twisting, bending and overhead reaching    Vanessa will be re-evaluated at the next follow up visit. Please call our clinic with questions or concerns: [phone]      Sincerely,    Shawna Salamanca CNP      (electronically signed)

## 2021-02-23 NOTE — TELEPHONE ENCOUNTER
Reason For Call: Patient is requesting a call back, she is needing an updated work letter, she can be reached at [phone].

## 2021-02-25 NOTE — TELEPHONE ENCOUNTER
Patient called stating her employer has specific requirements for the return to  Work letter. Her letter will need to be adjusted. Please call.

## 2021-02-25 NOTE — TELEPHONE ENCOUNTER
Spoke to patient. Reviewed the revisions she needs to update her letter. Reviewed changes with Shawna Salamanca DNP. Letter emailed to patient per her request to [email or web address] on 2/25/21.

## 2021-03-23 ENCOUNTER — TRANSFERRED RECORDS (OUTPATIENT)
Dept: HEALTH INFORMATION MANAGEMENT | Facility: CLINIC | Age: 66
End: 2021-03-23

## 2021-10-11 ENCOUNTER — HEALTH MAINTENANCE LETTER (OUTPATIENT)
Age: 66
End: 2021-10-11

## 2022-01-30 ENCOUNTER — HEALTH MAINTENANCE LETTER (OUTPATIENT)
Age: 67
End: 2022-01-30

## 2022-09-24 ENCOUNTER — HEALTH MAINTENANCE LETTER (OUTPATIENT)
Age: 67
End: 2022-09-24

## 2023-01-29 ENCOUNTER — HEALTH MAINTENANCE LETTER (OUTPATIENT)
Age: 68
End: 2023-01-29

## 2024-02-25 ENCOUNTER — HEALTH MAINTENANCE LETTER (OUTPATIENT)
Age: 69
End: 2024-02-25

## (undated) DEVICE — LINEN TOWEL PACK X5 5464

## (undated) DEVICE — DRAPE SHEET REV FOLD 3/4 9349

## (undated) DEVICE — GLOVE PROTEXIS BLUE W/NEU-THERA 8.5  2D73EB85

## (undated) DEVICE — TOOL DISSECT MIDAS MR8 14CM MATCH HEAD 3MM MR8-14MH30

## (undated) DEVICE — GLOVE PROTEXIS W/NEU-THERA 7.5  2D73TE75

## (undated) DEVICE — PACK SPINE SM CUSTOM SNE15SSFSK

## (undated) DEVICE — IONM UP TO 7 HOURS

## (undated) DEVICE — ESU ELEC BLADE 2.75" COATED/INSULATED E1455

## (undated) DEVICE — SPONGE KITTNER 30-101

## (undated) DEVICE — GLOVE PROTEXIS BLUE W/NEU-THERA 8.0  2D73EB80

## (undated) DEVICE — TUBING IRRIG FOR CODEMAN MALLIS BIPOLAR 12' 809103

## (undated) DEVICE — MANIFOLD NEPTUNE 4 PORT 700-20

## (undated) DEVICE — PREP DURAPREP 26ML APL 8630

## (undated) DEVICE — DRAPE IOBAN INCISE 23X17" 6650EZ

## (undated) DEVICE — SPONGE SURGIFOAM 12 1972

## (undated) DEVICE — DRAPE C-ARM 60X42" 1013

## (undated) DEVICE — IONM EEG SUPPLIES

## (undated) DEVICE — ESU PENCIL W/HOLSTER

## (undated) DEVICE — CUP AND LID 2PK 2OZ STERILE  SSK9006A

## (undated) DEVICE — SOL NACL 0.9% INJ 250ML BAG 2B1322Q

## (undated) DEVICE — SU VICRYL 3-0 RB-1 18" J713D

## (undated) DEVICE — PIN FIXATION MEDT ANT CERVICAL ATLANTIS 2MM 7080902

## (undated) DEVICE — GOWN XXL 9575

## (undated) RX ORDER — ONDANSETRON 2 MG/ML
INJECTION INTRAMUSCULAR; INTRAVENOUS
Status: DISPENSED
Start: 2020-11-04

## (undated) RX ORDER — PROPOFOL 10 MG/ML
INJECTION, EMULSION INTRAVENOUS
Status: DISPENSED
Start: 2020-11-04

## (undated) RX ORDER — HYDROMORPHONE HYDROCHLORIDE 1 MG/ML
INJECTION, SOLUTION INTRAMUSCULAR; INTRAVENOUS; SUBCUTANEOUS
Status: DISPENSED
Start: 2020-11-04

## (undated) RX ORDER — LIDOCAINE HYDROCHLORIDE 20 MG/ML
INJECTION, SOLUTION EPIDURAL; INFILTRATION; INTRACAUDAL; PERINEURAL
Status: DISPENSED
Start: 2020-11-04

## (undated) RX ORDER — DEXAMETHASONE SODIUM PHOSPHATE 4 MG/ML
INJECTION, SOLUTION INTRA-ARTICULAR; INTRALESIONAL; INTRAMUSCULAR; INTRAVENOUS; SOFT TISSUE
Status: DISPENSED
Start: 2020-11-04

## (undated) RX ORDER — REMIFENTANIL HYDROCHLORIDE 1 MG/ML
INJECTION, POWDER, LYOPHILIZED, FOR SOLUTION INTRAVENOUS
Status: DISPENSED
Start: 2020-11-04

## (undated) RX ORDER — CLINDAMYCIN PHOSPHATE 900 MG/50ML
INJECTION, SOLUTION INTRAVENOUS
Status: DISPENSED
Start: 2020-11-04

## (undated) RX ORDER — FENTANYL CITRATE 0.05 MG/ML
INJECTION, SOLUTION INTRAMUSCULAR; INTRAVENOUS
Status: DISPENSED
Start: 2020-11-04

## (undated) RX ORDER — FENTANYL CITRATE 50 UG/ML
INJECTION, SOLUTION INTRAMUSCULAR; INTRAVENOUS
Status: DISPENSED
Start: 2020-11-04